# Patient Record
Sex: MALE | Race: WHITE | NOT HISPANIC OR LATINO | Employment: UNEMPLOYED | URBAN - METROPOLITAN AREA
[De-identification: names, ages, dates, MRNs, and addresses within clinical notes are randomized per-mention and may not be internally consistent; named-entity substitution may affect disease eponyms.]

---

## 2017-03-15 ENCOUNTER — HISTORICAL (OUTPATIENT)
Dept: LAB | Facility: HOSPITAL | Age: 47
End: 2017-03-15

## 2017-09-06 ENCOUNTER — HISTORICAL (OUTPATIENT)
Dept: RADIOLOGY | Facility: HOSPITAL | Age: 47
End: 2017-09-06

## 2017-09-20 ENCOUNTER — HISTORICAL (OUTPATIENT)
Dept: LAB | Facility: HOSPITAL | Age: 47
End: 2017-09-20

## 2017-09-20 LAB
ABS NEUT (OLG): 4.8 X10(3)/MCL (ref 2.1–9.2)
ALBUMIN SERPL-MCNC: 3.6 GM/DL (ref 3.4–5)
ALBUMIN/GLOB SERPL: 0.7 RATIO (ref 1.1–2)
ALP SERPL-CCNC: 95 UNIT/L (ref 50–136)
ALT SERPL-CCNC: 157 UNIT/L (ref 12–78)
AST SERPL-CCNC: 131 UNIT/L (ref 15–37)
BASOPHILS # BLD AUTO: 0.1 X10(3)/MCL (ref 0–0.2)
BASOPHILS NFR BLD AUTO: 1 %
BILIRUB SERPL-MCNC: 1.2 MG/DL (ref 0.2–1)
BILIRUBIN DIRECT+TOT PNL SERPL-MCNC: 0.3 MG/DL (ref 0–0.5)
BILIRUBIN DIRECT+TOT PNL SERPL-MCNC: 0.9 MG/DL (ref 0–0.8)
BUN SERPL-MCNC: 13 MG/DL (ref 7–18)
CALCIUM SERPL-MCNC: 9.7 MG/DL (ref 8.5–10.1)
CHLORIDE SERPL-SCNC: 103 MMOL/L (ref 98–107)
CHOLEST SERPL-MCNC: 225 MG/DL (ref 0–200)
CHOLEST/HDLC SERPL: 4.5 {RATIO} (ref 0–5)
CO2 SERPL-SCNC: 27 MMOL/L (ref 21–32)
CREAT SERPL-MCNC: 1.11 MG/DL (ref 0.7–1.3)
EOSINOPHIL # BLD AUTO: 0.2 X10(3)/MCL (ref 0–0.9)
EOSINOPHIL NFR BLD AUTO: 3 %
ERYTHROCYTE [DISTWIDTH] IN BLOOD BY AUTOMATED COUNT: 13.8 % (ref 11.5–17)
EST. AVERAGE GLUCOSE BLD GHB EST-MCNC: 157 MG/DL
GLOBULIN SER-MCNC: 5 GM/DL (ref 2.4–3.5)
GLUCOSE SERPL-MCNC: 302 MG/DL (ref 74–106)
HBA1C MFR BLD: 7.1 % (ref 4.2–6.3)
HCT VFR BLD AUTO: 51.1 % (ref 42–52)
HDLC SERPL-MCNC: 50 MG/DL (ref 35–60)
HGB BLD-MCNC: 17.3 GM/DL (ref 14–18)
LDLC SERPL CALC-MCNC: 154 MG/DL (ref 0–129)
LYMPHOCYTES # BLD AUTO: 1.8 X10(3)/MCL (ref 0.6–4.6)
LYMPHOCYTES NFR BLD AUTO: 24 %
MCH RBC QN AUTO: 32.5 PG (ref 27–31)
MCHC RBC AUTO-ENTMCNC: 33.9 GM/DL (ref 33–36)
MCV RBC AUTO: 96.1 FL (ref 80–94)
MONOCYTES # BLD AUTO: 0.8 X10(3)/MCL (ref 0.1–1.3)
MONOCYTES NFR BLD AUTO: 10 %
NEUTROPHILS # BLD AUTO: 4.8 X10(3)/MCL (ref 1.4–7.9)
NEUTROPHILS NFR BLD AUTO: 63 %
PLATELET # BLD AUTO: 216 X10(3)/MCL (ref 130–400)
PMV BLD AUTO: 10.2 FL (ref 9.4–12.4)
POTASSIUM SERPL-SCNC: 4.7 MMOL/L (ref 3.5–5.1)
PROT SERPL-MCNC: 8.6 GM/DL (ref 6.4–8.2)
RBC # BLD AUTO: 5.32 X10(6)/MCL (ref 4.7–6.1)
SODIUM SERPL-SCNC: 141 MMOL/L (ref 136–145)
TRIGL SERPL-MCNC: 106 MG/DL (ref 30–150)
TSH SERPL-ACNC: 3.78 MIU/ML (ref 0.36–3.74)
VLDLC SERPL CALC-MCNC: 21 MG/DL
WBC # SPEC AUTO: 7.7 X10(3)/MCL (ref 4.5–11.5)

## 2018-03-21 ENCOUNTER — HISTORICAL (OUTPATIENT)
Dept: LAB | Facility: HOSPITAL | Age: 48
End: 2018-03-21

## 2018-03-21 LAB
ALBUMIN SERPL-MCNC: 3.8 GM/DL (ref 3.4–5)
ALBUMIN/GLOB SERPL: 0.8 RATIO (ref 1.1–2)
ALP SERPL-CCNC: 90 UNIT/L (ref 50–136)
ALT SERPL-CCNC: 182 UNIT/L (ref 12–78)
AST SERPL-CCNC: 146 UNIT/L (ref 15–37)
BILIRUB SERPL-MCNC: 1.5 MG/DL (ref 0.2–1)
BILIRUBIN DIRECT+TOT PNL SERPL-MCNC: 0.4 MG/DL (ref 0–0.5)
BILIRUBIN DIRECT+TOT PNL SERPL-MCNC: 1.1 MG/DL (ref 0–0.8)
BUN SERPL-MCNC: 19 MG/DL (ref 7–18)
CALCIUM SERPL-MCNC: 9.9 MG/DL (ref 8.5–10.1)
CHLORIDE SERPL-SCNC: 101 MMOL/L (ref 98–107)
CHOLEST SERPL-MCNC: 198 MG/DL (ref 0–200)
CHOLEST/HDLC SERPL: 3.7 {RATIO} (ref 0–5)
CO2 SERPL-SCNC: 24 MMOL/L (ref 21–32)
CREAT SERPL-MCNC: 1.11 MG/DL (ref 0.7–1.3)
EST. AVERAGE GLUCOSE BLD GHB EST-MCNC: 163 MG/DL
GLOBULIN SER-MCNC: 4.6 GM/DL (ref 2.4–3.5)
GLUCOSE SERPL-MCNC: 148 MG/DL (ref 74–106)
HBA1C MFR BLD: 7.3 % (ref 4.2–6.3)
HDLC SERPL-MCNC: 53 MG/DL (ref 35–60)
LDLC SERPL CALC-MCNC: 116 MG/DL (ref 0–129)
POTASSIUM SERPL-SCNC: 4.4 MMOL/L (ref 3.5–5.1)
PROT SERPL-MCNC: 8.4 GM/DL (ref 6.4–8.2)
SODIUM SERPL-SCNC: 136 MMOL/L (ref 136–145)
TRIGL SERPL-MCNC: 144 MG/DL (ref 30–150)
TSH SERPL-ACNC: 6.85 MIU/ML (ref 0.36–3.74)
VLDLC SERPL CALC-MCNC: 29 MG/DL

## 2018-03-28 ENCOUNTER — HISTORICAL (OUTPATIENT)
Dept: LAB | Facility: HOSPITAL | Age: 48
End: 2018-03-28

## 2018-03-28 ENCOUNTER — HISTORICAL (OUTPATIENT)
Dept: RADIOLOGY | Facility: HOSPITAL | Age: 48
End: 2018-03-28

## 2018-03-28 LAB
APPEARANCE, UA: CLEAR
BACTERIA SPEC CULT: ABNORMAL /HPF
BILIRUB UR QL STRIP: NEGATIVE
COLOR UR: ABNORMAL
CREAT UR-MCNC: 229 MG/DL
FERRITIN SERPL-MCNC: 274.6 NG/ML (ref 8–388)
GLUCOSE (UA): ABNORMAL
HAV IGM SERPL QL IA: NEGATIVE
HBV CORE IGM SERPL QL IA: NEGATIVE
HBV SURFACE AG SERPL QL IA: NEGATIVE
HCV AB SERPL QL IA: NEGATIVE
HEPATITIS PANEL INTERP: NORMAL
HGB UR QL STRIP: NEGATIVE
KETONES UR QL STRIP: NEGATIVE
LEUKOCYTE ESTERASE UR QL STRIP: NEGATIVE
MICROALBUMIN UR-MCNC: 2.2 MG/DL
MICROALBUMIN/CREAT RATIO PNL UR: 9.6 MG/GM CR (ref 0–30)
NITRITE UR QL STRIP: NEGATIVE
PH UR STRIP: 6 [PH] (ref 5–9)
PROT UR QL STRIP: NEGATIVE
RBC #/AREA URNS HPF: ABNORMAL /[HPF]
SP GR UR STRIP: 1.03 (ref 1–1.03)
SQUAMOUS EPITHELIAL, UA: ABNORMAL
UA WBC MAN: ABNORMAL /HPF
UROBILINOGEN UR STRIP-ACNC: 1

## 2018-08-31 ENCOUNTER — HISTORICAL (OUTPATIENT)
Dept: LAB | Facility: HOSPITAL | Age: 48
End: 2018-08-31

## 2018-08-31 LAB
ALBUMIN SERPL-MCNC: 3.7 GM/DL (ref 3.4–5)
ALBUMIN/GLOB SERPL: 0.8 RATIO (ref 1.1–2)
ALP SERPL-CCNC: 89 UNIT/L (ref 50–136)
ALT SERPL-CCNC: 172 UNIT/L (ref 12–78)
AST SERPL-CCNC: 168 UNIT/L (ref 15–37)
BILIRUB SERPL-MCNC: 1.4 MG/DL (ref 0.2–1)
BILIRUBIN DIRECT+TOT PNL SERPL-MCNC: 0.4 MG/DL (ref 0–0.5)
BILIRUBIN DIRECT+TOT PNL SERPL-MCNC: 1 MG/DL (ref 0–0.8)
BUN SERPL-MCNC: 14 MG/DL (ref 7–18)
CALCIUM SERPL-MCNC: 9.7 MG/DL (ref 8.5–10.1)
CHLORIDE SERPL-SCNC: 105 MMOL/L (ref 98–107)
CHOLEST SERPL-MCNC: 177 MG/DL (ref 0–200)
CHOLEST/HDLC SERPL: 3.4 {RATIO} (ref 0–5)
CO2 SERPL-SCNC: 27 MMOL/L (ref 21–32)
CREAT SERPL-MCNC: 1.01 MG/DL (ref 0.7–1.3)
EST. AVERAGE GLUCOSE BLD GHB EST-MCNC: 203 MG/DL
GLOBULIN SER-MCNC: 4.5 GM/DL (ref 2.4–3.5)
GLUCOSE SERPL-MCNC: 142 MG/DL (ref 74–106)
HBA1C MFR BLD: 8.7 % (ref 4.2–6.3)
HDLC SERPL-MCNC: 52 MG/DL (ref 35–60)
LDLC SERPL CALC-MCNC: 107 MG/DL (ref 0–129)
POTASSIUM SERPL-SCNC: 4.8 MMOL/L (ref 3.5–5.1)
PROT SERPL-MCNC: 8.2 GM/DL (ref 6.4–8.2)
SODIUM SERPL-SCNC: 139 MMOL/L (ref 136–145)
TRIGL SERPL-MCNC: 88 MG/DL (ref 30–150)
TSH SERPL-ACNC: 3.98 MIU/L (ref 0.36–3.74)
VLDLC SERPL CALC-MCNC: 18 MG/DL

## 2019-04-01 ENCOUNTER — HISTORICAL (OUTPATIENT)
Dept: LAB | Facility: HOSPITAL | Age: 49
End: 2019-04-01

## 2019-04-01 LAB
ALBUMIN SERPL-MCNC: 3.1 GM/DL (ref 3.4–5)
ALBUMIN/GLOB SERPL: 0.5 {RATIO}
ALP SERPL-CCNC: 88 UNIT/L (ref 45–117)
ALT SERPL-CCNC: 79 UNIT/L (ref 16–61)
AST SERPL-CCNC: 67 UNIT/L (ref 15–37)
BILIRUB SERPL-MCNC: 0.8 MG/DL (ref 0.2–1)
BILIRUBIN DIRECT+TOT PNL SERPL-MCNC: 0.24 MG/DL (ref 0–0.2)
BILIRUBIN DIRECT+TOT PNL SERPL-MCNC: 0.56 MG/DL (ref 0–1)
BUN SERPL-MCNC: 16 MG/DL (ref 7–18)
CALCIUM SERPL-MCNC: 8.6 MG/DL (ref 8.5–10.1)
CHLORIDE SERPL-SCNC: 107 MMOL/L (ref 98–107)
CO2 SERPL-SCNC: 23 MMOL/L (ref 21–32)
CREAT SERPL-MCNC: 0.99 MG/DL (ref 0.7–1.3)
GLOBULIN SER-MCNC: 6 GM/DL (ref 2–4)
GLUCOSE SERPL-MCNC: 220 MG/DL (ref 74–106)
POTASSIUM SERPL-SCNC: 4.1 MMOL/L (ref 3.5–5.1)
PROT SERPL-MCNC: 8.6 GM/DL (ref 6.4–8.2)
SODIUM SERPL-SCNC: 140 MMOL/L (ref 136–145)

## 2019-08-15 ENCOUNTER — HISTORICAL (OUTPATIENT)
Dept: LAB | Facility: HOSPITAL | Age: 49
End: 2019-08-15

## 2019-08-15 LAB
ABS NEUT (OLG): 4.02 X10(3)/MCL (ref 2.1–9.2)
ALBUMIN SERPL-MCNC: 3.3 GM/DL (ref 3.4–5)
ALBUMIN/GLOB SERPL: 0.7 RATIO (ref 1.1–2)
ALP SERPL-CCNC: 91 UNIT/L (ref 50–136)
ALT SERPL-CCNC: 115 UNIT/L (ref 12–78)
AST SERPL-CCNC: 92 UNIT/L (ref 15–37)
BASOPHILS # BLD AUTO: 0.1 X10(3)/MCL (ref 0–0.2)
BASOPHILS NFR BLD AUTO: 1 %
BILIRUB SERPL-MCNC: 1.2 MG/DL (ref 0.2–1)
BILIRUBIN DIRECT+TOT PNL SERPL-MCNC: 0.3 MG/DL (ref 0–0.5)
BILIRUBIN DIRECT+TOT PNL SERPL-MCNC: 0.9 MG/DL (ref 0–0.8)
BUN SERPL-MCNC: 15 MG/DL (ref 7–18)
CALCIUM SERPL-MCNC: 9.3 MG/DL (ref 8.5–10.1)
CHLORIDE SERPL-SCNC: 103 MMOL/L (ref 98–107)
CHOLEST SERPL-MCNC: 188 MG/DL (ref 0–200)
CHOLEST/HDLC SERPL: 3.3 {RATIO} (ref 0–5)
CO2 SERPL-SCNC: 25 MMOL/L (ref 21–32)
CREAT SERPL-MCNC: 0.86 MG/DL (ref 0.7–1.3)
EOSINOPHIL # BLD AUTO: 0.3 X10(3)/MCL (ref 0–0.9)
EOSINOPHIL NFR BLD AUTO: 5 %
ERYTHROCYTE [DISTWIDTH] IN BLOOD BY AUTOMATED COUNT: 14.3 % (ref 11.5–17)
EST. AVERAGE GLUCOSE BLD GHB EST-MCNC: 186 MG/DL
GLOBULIN SER-MCNC: 4.9 GM/DL (ref 2.4–3.5)
GLUCOSE SERPL-MCNC: 239 MG/DL (ref 74–106)
HBA1C MFR BLD: 8.1 % (ref 4.2–6.3)
HCT VFR BLD AUTO: 50.1 % (ref 42–52)
HDLC SERPL-MCNC: 57 MG/DL (ref 35–60)
HGB BLD-MCNC: 16.4 GM/DL (ref 14–18)
LDLC SERPL CALC-MCNC: 110 MG/DL (ref 0–129)
LYMPHOCYTES # BLD AUTO: 2 X10(3)/MCL (ref 0.6–4.6)
LYMPHOCYTES NFR BLD AUTO: 28 %
MCH RBC QN AUTO: 31.3 PG (ref 27–31)
MCHC RBC AUTO-ENTMCNC: 32.7 GM/DL (ref 33–36)
MCV RBC AUTO: 95.6 FL (ref 80–94)
MONOCYTES # BLD AUTO: 0.7 X10(3)/MCL (ref 0.1–1.3)
MONOCYTES NFR BLD AUTO: 10 %
NEUTROPHILS # BLD AUTO: 4.02 X10(3)/MCL (ref 2.1–9.2)
NEUTROPHILS NFR BLD AUTO: 56 %
PLATELET # BLD AUTO: 169 X10(3)/MCL (ref 130–400)
PMV BLD AUTO: 11 FL (ref 9.4–12.4)
POTASSIUM SERPL-SCNC: 4.3 MMOL/L (ref 3.5–5.1)
PROT SERPL-MCNC: 8.2 GM/DL (ref 6.4–8.2)
PSA SERPL-MCNC: 0.68 NG/ML (ref 0–4)
RBC # BLD AUTO: 5.24 X10(6)/MCL (ref 4.7–6.1)
SODIUM SERPL-SCNC: 139 MMOL/L (ref 136–145)
TRIGL SERPL-MCNC: 104 MG/DL (ref 30–150)
TSH SERPL-ACNC: 1.34 MIU/L (ref 0.36–3.74)
VLDLC SERPL CALC-MCNC: 21 MG/DL
WBC # SPEC AUTO: 7.2 X10(3)/MCL (ref 4.5–11.5)

## 2019-12-26 ENCOUNTER — HISTORICAL (OUTPATIENT)
Dept: LAB | Facility: HOSPITAL | Age: 49
End: 2019-12-26

## 2019-12-26 LAB
APTT PPP: 30.3 SEC (ref 23.4–34.9)
CHOLEST SERPL-MCNC: 159 MG/DL (ref 0–199)
CHOLEST/HDLC SERPL: 3 {RATIO}
EST. AVERAGE GLUCOSE BLD GHB EST-MCNC: 108 MG/DL
HBA1C MFR BLD: 5.4 % (ref 4.2–6.3)
HDLC SERPL-MCNC: 46 MG/DL
INR PPP: 1.1 (ref 2–3)
LDLC SERPL CALC-MCNC: 96 MG/DL (ref 0–129)
PROTHROMBIN TIME: 14.5 SEC (ref 11.7–14.5)
TRIGL SERPL-MCNC: 87 MG/DL (ref 0–149)
TSH SERPL-ACNC: 1.05 MIU/ML (ref 0.36–3.74)
VLDLC SERPL CALC-MCNC: 17 MG/DL

## 2019-12-27 ENCOUNTER — HISTORICAL (OUTPATIENT)
Dept: RADIOLOGY | Facility: HOSPITAL | Age: 49
End: 2019-12-27

## 2020-05-26 ENCOUNTER — HOSPITAL ENCOUNTER (OUTPATIENT)
Dept: MEDSURG UNIT | Facility: HOSPITAL | Age: 50
End: 2020-05-29
Attending: INTERNAL MEDICINE | Admitting: INTERNAL MEDICINE

## 2020-05-26 LAB
ABS NEUT (OLG): 5.73 X10(3)/MCL (ref 2.1–9.2)
ALBUMIN SERPL-MCNC: 3.3 GM/DL (ref 3.5–5)
ALBUMIN/GLOB SERPL: 0.7 RATIO (ref 1.1–2)
ALP SERPL-CCNC: 103 UNIT/L (ref 40–150)
ALT SERPL-CCNC: 76 UNIT/L (ref 0–55)
AMPHET UR QL SCN: NEGATIVE
APPEARANCE, UA: CLEAR
AST SERPL-CCNC: 66 UNIT/L (ref 5–34)
BARBITURATE SCN PRESENT UR: NEGATIVE
BASOPHILS # BLD AUTO: 0.05 X10(3)/MCL (ref 0–0.2)
BASOPHILS NFR BLD AUTO: 0.6 % (ref 0–0.9)
BENZODIAZ UR QL SCN: NEGATIVE
BILIRUB SERPL-MCNC: 1.7 MG/DL (ref 0.2–1.2)
BILIRUB UR QL STRIP: NEGATIVE
BILIRUBIN DIRECT+TOT PNL SERPL-MCNC: 0.6 MG/DL (ref 0–0.5)
BILIRUBIN DIRECT+TOT PNL SERPL-MCNC: 1.1 MG/DL (ref 0–0.8)
BUN SERPL-MCNC: 23.3 MG/DL (ref 8.4–25.7)
CALCIUM SERPL-MCNC: 9.5 MG/DL (ref 8.4–10.2)
CANNABINOIDS UR QL SCN: NEGATIVE
CHLORIDE SERPL-SCNC: 107 MMOL/L (ref 98–107)
CO2 SERPL-SCNC: 24 MMOL/L (ref 22–29)
COCAINE UR QL SCN: NEGATIVE
COLOR UR: YELLOW
CREAT SERPL-MCNC: 0.76 MG/DL (ref 0.72–1.25)
EOSINOPHIL # BLD AUTO: 0.15 X10(3)/MCL (ref 0–0.9)
EOSINOPHIL NFR BLD AUTO: 1.7 % (ref 0–6.5)
ERYTHROCYTE [DISTWIDTH] IN BLOOD BY AUTOMATED COUNT: 13.8 % (ref 11.5–17)
GLOBULIN SER-MCNC: 4.5 GM/DL (ref 2.4–3.5)
GLUCOSE (UA): NEGATIVE
GLUCOSE SERPL-MCNC: 132 MG/DL (ref 74–100)
HCT VFR BLD AUTO: 43 % (ref 42–52)
HGB BLD-MCNC: 14.8 GM/DL (ref 14–18)
HGB UR QL STRIP: NEGATIVE
IMM GRANULOCYTES # BLD AUTO: 0.01 10*3/UL (ref 0–0.02)
IMM GRANULOCYTES NFR BLD AUTO: 0.1 % (ref 0–0.43)
KETONES UR QL STRIP: NEGATIVE
LACTATE SERPL-SCNC: 0.9 MMOL/L (ref 0.5–2.2)
LEUKOCYTE ESTERASE UR QL STRIP: NEGATIVE
LYMPHOCYTES # BLD AUTO: 1.99 X10(3)/MCL (ref 0.6–4.6)
LYMPHOCYTES NFR BLD AUTO: 22.8 % (ref 16.2–38.3)
MCH RBC QN AUTO: 32.7 PG (ref 27–31)
MCHC RBC AUTO-ENTMCNC: 34.4 GM/DL (ref 33–36)
MCV RBC AUTO: 94.9 FL (ref 80–94)
METHADONE UR QL SCN: NEGATIVE
MONOCYTES # BLD AUTO: 0.8 X10(3)/MCL (ref 0.1–1.3)
MONOCYTES NFR BLD AUTO: 9.2 % (ref 4.7–11.3)
NEUTROPHILS # BLD AUTO: 5.73 X10(3)/MCL (ref 2.1–9.2)
NEUTROPHILS NFR BLD AUTO: 65.6 % (ref 49.1–73.4)
NITRITE UR QL STRIP: NEGATIVE
NRBC BLD AUTO-RTO: 0 % (ref 0–0.2)
OPIATES UR QL SCN: NEGATIVE
PCP UR QL: NEGATIVE
PH UR STRIP: 6 [PH] (ref 5–7)
PLATELET # BLD AUTO: 155 X10(3)/MCL (ref 130–400)
PMV BLD AUTO: 9.6 FL (ref 7.4–10.4)
POTASSIUM SERPL-SCNC: 3.9 MMOL/L (ref 3.5–5.1)
PROT SERPL-MCNC: 7.8 GM/DL (ref 6.4–8.3)
PROT UR QL STRIP: NEGATIVE
RBC # BLD AUTO: 4.53 X10(6)/MCL (ref 4.7–6.1)
SARS-COV-2 RNA RESP QL NAA+PROBE: NOT DETECTED
SODIUM SERPL-SCNC: 140 MMOL/L (ref 136–145)
SP GR UR STRIP: 1.02 (ref 1–1.03)
TROPONIN I SERPL-MCNC: <0.01 NG/ML (ref 0.01–0.03)
TSH SERPL-ACNC: 2.25 UIU/ML (ref 0.35–4.94)
UROBILINOGEN UR STRIP-ACNC: NEGATIVE
WBC # SPEC AUTO: 8.7 X10(3)/MCL (ref 4.5–11.5)

## 2020-05-27 LAB
ABS NEUT (OLG): 3.44 X10(3)/MCL (ref 2.1–9.2)
BASOPHILS # BLD AUTO: 0 X10(3)/MCL (ref 0–0.2)
BASOPHILS NFR BLD AUTO: 1 %
BUN SERPL-MCNC: 20.1 MG/DL (ref 8.4–25.7)
CALCIUM SERPL-MCNC: 8.9 MG/DL (ref 8.4–10.2)
CHLORIDE SERPL-SCNC: 108 MMOL/L (ref 98–107)
CO2 SERPL-SCNC: 23 MMOL/L (ref 22–29)
CREAT SERPL-MCNC: 0.72 MG/DL (ref 0.73–1.18)
CREAT/UREA NIT SERPL: 28
D DIMER PPP IA.FEU-MCNC: <0.27 MCG/ML FEU (ref 0–0.5)
EOSINOPHIL # BLD AUTO: 0.2 X10(3)/MCL (ref 0–0.9)
EOSINOPHIL NFR BLD AUTO: 3 %
ERYTHROCYTE [DISTWIDTH] IN BLOOD BY AUTOMATED COUNT: 14.2 % (ref 11.5–17)
EST. AVERAGE GLUCOSE BLD GHB EST-MCNC: 102.5 MG/DL
GLUCOSE SERPL-MCNC: 97 MG/DL (ref 74–100)
HBA1C MFR BLD: 5.2 %
HCT VFR BLD AUTO: 41.1 % (ref 42–52)
HGB BLD-MCNC: 13.4 GM/DL (ref 14–18)
LYMPHOCYTES # BLD AUTO: 2 X10(3)/MCL (ref 0.6–4.6)
LYMPHOCYTES NFR BLD AUTO: 32 %
MCH RBC QN AUTO: 31.7 PG (ref 27–31)
MCHC RBC AUTO-ENTMCNC: 32.6 GM/DL (ref 33–36)
MCV RBC AUTO: 97.2 FL (ref 80–94)
MONOCYTES # BLD AUTO: 0.7 X10(3)/MCL (ref 0.1–1.3)
MONOCYTES NFR BLD AUTO: 10 %
NEUTROPHILS # BLD AUTO: 3.44 X10(3)/MCL (ref 2.1–9.2)
NEUTROPHILS NFR BLD AUTO: 54 %
PLATELET # BLD AUTO: 126 X10(3)/MCL (ref 130–400)
PMV BLD AUTO: 10.5 FL (ref 9.4–12.4)
POTASSIUM SERPL-SCNC: 4.2 MMOL/L (ref 3.5–5.1)
RBC # BLD AUTO: 4.23 X10(6)/MCL (ref 4.7–6.1)
SODIUM SERPL-SCNC: 140 MMOL/L (ref 136–145)
WBC # SPEC AUTO: 6.4 X10(3)/MCL (ref 4.5–11.5)

## 2020-05-28 LAB
ALBUMIN SERPL-MCNC: 3.1 GM/DL (ref 3.5–5)
ALBUMIN/GLOB SERPL: 0.7 RATIO (ref 1.1–2)
ALP SERPL-CCNC: 93 UNIT/L (ref 40–150)
ALT SERPL-CCNC: 74 UNIT/L (ref 0–55)
AST SERPL-CCNC: 79 UNIT/L (ref 5–34)
BILIRUB SERPL-MCNC: 1.5 MG/DL
BILIRUBIN DIRECT+TOT PNL SERPL-MCNC: 0.6 MG/DL (ref 0–0.5)
BILIRUBIN DIRECT+TOT PNL SERPL-MCNC: 0.9 MG/DL (ref 0–0.8)
BUN SERPL-MCNC: 21.5 MG/DL (ref 8.4–25.7)
CALCIUM SERPL-MCNC: 9 MG/DL (ref 8.4–10.2)
CHLORIDE SERPL-SCNC: 106 MMOL/L (ref 98–107)
CO2 SERPL-SCNC: 25 MMOL/L (ref 22–29)
CREAT SERPL-MCNC: 0.65 MG/DL (ref 0.73–1.18)
GLOBULIN SER-MCNC: 4.3 GM/DL (ref 2.4–3.5)
GLUCOSE SERPL-MCNC: 108 MG/DL (ref 74–100)
POTASSIUM SERPL-SCNC: 3.9 MMOL/L (ref 3.5–5.1)
PROT SERPL-MCNC: 7.4 GM/DL (ref 6.4–8.3)
SODIUM SERPL-SCNC: 139 MMOL/L (ref 136–145)

## 2020-05-31 LAB
FINAL CULTURE: NORMAL
FINAL CULTURE: NORMAL

## 2020-06-09 ENCOUNTER — HISTORICAL (OUTPATIENT)
Dept: LAB | Facility: HOSPITAL | Age: 50
End: 2020-06-09

## 2020-06-09 LAB
CREAT UR-MCNC: 112.7 MG/DL (ref 58–161)
MICROALBUMIN UR-MCNC: 7.3 UG/ML
MICROALBUMIN/CREAT RATIO PNL UR: 6.5 MG/GM CR (ref 0–30)

## 2020-07-06 ENCOUNTER — HISTORICAL (OUTPATIENT)
Dept: LAB | Facility: HOSPITAL | Age: 50
End: 2020-07-06

## 2020-07-06 LAB
ABS NEUT (OLG): 4.21 X10(3)/MCL (ref 2.1–9.2)
ALBUMIN SERPL-MCNC: 3.5 GM/DL (ref 3.5–5)
ALBUMIN/GLOB SERPL: 0.7 RATIO (ref 1.1–2)
ALP SERPL-CCNC: 115 UNIT/L (ref 40–150)
ALT SERPL-CCNC: 108 UNIT/L (ref 0–55)
AST SERPL-CCNC: 96 UNIT/L (ref 5–34)
BILIRUB SERPL-MCNC: 1.9 MG/DL (ref 0.2–1.2)
BILIRUBIN DIRECT+TOT PNL SERPL-MCNC: 0.6 MG/DL (ref 0–0.5)
BILIRUBIN DIRECT+TOT PNL SERPL-MCNC: 1.3 MG/DL (ref 0–0.8)
BUN SERPL-MCNC: 20.3 MG/DL (ref 8.4–25.7)
CALCIUM SERPL-MCNC: 9.8 MG/DL (ref 8.4–10.2)
CHLORIDE SERPL-SCNC: 104 MMOL/L (ref 98–107)
CO2 SERPL-SCNC: 24 MMOL/L (ref 22–29)
CREAT SERPL-MCNC: 0.7 MG/DL (ref 0.72–1.25)
ERYTHROCYTE [DISTWIDTH] IN BLOOD BY AUTOMATED COUNT: 13.6 % (ref 11.5–17)
FERRITIN SERPL-MCNC: 194.07 NG/ML (ref 21.81–274.66)
GLOBULIN SER-MCNC: 4.7 GM/DL (ref 2.4–3.5)
GLUCOSE SERPL-MCNC: 100 MG/DL (ref 74–100)
HBV SURFACE AB SERPL IA-ACNC: NONREACTIVE M[IU]/ML
HCT VFR BLD AUTO: 44.4 % (ref 42–52)
HGB BLD-MCNC: 15.3 GM/DL (ref 14–18)
INR PPP: 1.1 (ref 2–3)
IRON SATN MFR SERPL: 38 % (ref 20–50)
IRON SERPL-MCNC: 142 UG/DL (ref 65–175)
MCH RBC QN AUTO: 31.9 PG (ref 27–31)
MCHC RBC AUTO-ENTMCNC: 34.5 GM/DL (ref 33–36)
MCV RBC AUTO: 92.7 FL (ref 80–94)
NRBC BLD AUTO-RTO: 0 % (ref 0–0.2)
PLATELET # BLD AUTO: 169 X10(3)/MCL (ref 130–400)
PMV BLD AUTO: 10 FL (ref 7.4–10.4)
POTASSIUM SERPL-SCNC: 4.1 MMOL/L (ref 3.5–5.1)
PROT SERPL-MCNC: 8.2 GM/DL (ref 6.4–8.3)
PROTHROMBIN TIME: 14.3 SEC (ref 11.7–14.5)
RBC # BLD AUTO: 4.79 X10(6)/MCL (ref 4.7–6.1)
SODIUM SERPL-SCNC: 138 MMOL/L (ref 136–145)
TIBC SERPL-MCNC: 229 UG/DL (ref 69–240)
TIBC SERPL-MCNC: 371 UG/DL (ref 250–450)
TRANSFERRIN SERPL-MCNC: 325 MG/DL (ref 174–364)
WBC # SPEC AUTO: 7.1 X10(3)/MCL (ref 4.5–11.5)

## 2020-10-16 ENCOUNTER — HISTORICAL (OUTPATIENT)
Dept: ADMINISTRATIVE | Facility: HOSPITAL | Age: 50
End: 2020-10-16

## 2020-10-16 LAB
ABS NEUT (OLG): 3.97 X10(3)/MCL (ref 2.1–9.2)
ALBUMIN SERPL-MCNC: 3.8 GM/DL (ref 3.5–5)
ALBUMIN/GLOB SERPL: 1 RATIO (ref 1.1–2)
ALP SERPL-CCNC: 94 UNIT/L (ref 40–150)
ALT SERPL-CCNC: 101 UNIT/L (ref 0–55)
APPEARANCE, UA: CLEAR
AST SERPL-CCNC: 94 UNIT/L (ref 5–34)
BACTERIA SPEC CULT: ABNORMAL /HPF
BASOPHILS # BLD AUTO: 0.1 X10(3)/MCL (ref 0–0.2)
BASOPHILS NFR BLD AUTO: 1 %
BILIRUB SERPL-MCNC: 2 MG/DL
BILIRUB UR QL STRIP: NEGATIVE
BILIRUBIN DIRECT+TOT PNL SERPL-MCNC: 0.7 MG/DL (ref 0–0.5)
BILIRUBIN DIRECT+TOT PNL SERPL-MCNC: 1.3 MG/DL (ref 0–0.8)
BUN SERPL-MCNC: 20.8 MG/DL (ref 8.4–25.7)
CALCIUM SERPL-MCNC: 8.9 MG/DL (ref 8.4–10.2)
CHLORIDE SERPL-SCNC: 104 MMOL/L (ref 98–107)
CHOLEST SERPL-MCNC: 161 MG/DL
CHOLEST/HDLC SERPL: 3 {RATIO} (ref 0–5)
CO2 SERPL-SCNC: 25 MMOL/L (ref 22–29)
COLOR UR: YELLOW
CREAT SERPL-MCNC: 0.73 MG/DL (ref 0.73–1.18)
CREAT UR-MCNC: 107 MG/DL (ref 58–161)
EOSINOPHIL # BLD AUTO: 0.3 X10(3)/MCL (ref 0–0.9)
EOSINOPHIL NFR BLD AUTO: 4 %
ERYTHROCYTE [DISTWIDTH] IN BLOOD BY AUTOMATED COUNT: 14 % (ref 11.5–17)
EST. AVERAGE GLUCOSE BLD GHB EST-MCNC: 102.5 MG/DL
GLOBULIN SER-MCNC: 3.7 GM/DL (ref 2.4–3.5)
GLUCOSE (UA): NEGATIVE
GLUCOSE SERPL-MCNC: 95 MG/DL (ref 74–100)
HBA1C MFR BLD: 5.2 %
HCT VFR BLD AUTO: 47.5 % (ref 42–52)
HDLC SERPL-MCNC: 60 MG/DL (ref 35–60)
HGB BLD-MCNC: 15.6 GM/DL (ref 14–18)
HGB UR QL STRIP: NEGATIVE
KETONES UR QL STRIP: NEGATIVE
LDLC SERPL CALC-MCNC: 85 MG/DL (ref 50–140)
LEUKOCYTE ESTERASE UR QL STRIP: ABNORMAL
LYMPHOCYTES # BLD AUTO: 2.1 X10(3)/MCL (ref 0.6–4.6)
LYMPHOCYTES NFR BLD AUTO: 29 %
MCH RBC QN AUTO: 31.6 PG (ref 27–31)
MCHC RBC AUTO-ENTMCNC: 32.8 GM/DL (ref 33–36)
MCV RBC AUTO: 96.3 FL (ref 80–94)
MICROALBUMIN UR-MCNC: 16.1 UG/ML
MICROALBUMIN/CREAT RATIO PNL UR: 15 MG/GM CR (ref 0–30)
MONOCYTES # BLD AUTO: 0.9 X10(3)/MCL (ref 0.1–1.3)
MONOCYTES NFR BLD AUTO: 12 %
NEUTROPHILS # BLD AUTO: 3.97 X10(3)/MCL (ref 2.1–9.2)
NEUTROPHILS NFR BLD AUTO: 54 %
NITRITE UR QL STRIP: NEGATIVE
PH UR STRIP: 6 [PH] (ref 5–9)
PLATELET # BLD AUTO: 151 X10(3)/MCL (ref 130–400)
PMV BLD AUTO: 10.6 FL (ref 9.4–12.4)
POTASSIUM SERPL-SCNC: 4.1 MMOL/L (ref 3.5–5.1)
PROT SERPL-MCNC: 7.5 GM/DL (ref 6.4–8.3)
PROT UR QL STRIP: NEGATIVE
RBC # BLD AUTO: 4.93 X10(6)/MCL (ref 4.7–6.1)
RBC #/AREA URNS HPF: ABNORMAL /[HPF]
SODIUM SERPL-SCNC: 138 MMOL/L (ref 136–145)
SP GR UR STRIP: 1.02 (ref 1–1.03)
SQUAMOUS EPITHELIAL, UA: ABNORMAL
TRIGL SERPL-MCNC: 79 MG/DL (ref 34–140)
TSH SERPL-ACNC: 1.55 UIU/ML (ref 0.35–4.94)
UROBILINOGEN UR STRIP-ACNC: 0.2
VLDLC SERPL CALC-MCNC: 16 MG/DL
WBC # SPEC AUTO: 7.3 X10(3)/MCL (ref 4.5–11.5)
WBC #/AREA URNS HPF: ABNORMAL /HPF

## 2020-12-29 LAB — CRC RECOMMENDATION EXT: NORMAL

## 2022-04-10 ENCOUNTER — HISTORICAL (OUTPATIENT)
Dept: ADMINISTRATIVE | Facility: HOSPITAL | Age: 52
End: 2022-04-10
Payer: MEDICARE

## 2022-04-24 VITALS
BODY MASS INDEX: 37.07 KG/M2 | DIASTOLIC BLOOD PRESSURE: 73 MMHG | OXYGEN SATURATION: 97 % | HEIGHT: 72 IN | WEIGHT: 273.69 LBS | SYSTOLIC BLOOD PRESSURE: 127 MMHG

## 2022-04-29 NOTE — ED PROVIDER NOTES
Patient:   Naeem Muro             MRN: [id number]            FIN: [id number]               Age:   50 years     Sex:  Male     :  1970   Associated Diagnoses:   Acute neck pain; Acute chest pain; Altered level of consciousness; Near syncope   Author:   Comfort Dick MD      Basic Information   History source: Patient.   Arrival mode: Private vehicle.   History limitation: None.   Additional information: Patient's physician(s): PCP Dr Ramirez, Chief Complaint from Nursing Triage Note : Chief Complaint   2020 0:00 CDT       Chief Complaint           neck and chest pain muscle spasms starting 30 minutes  .      History of Present Illness   The patient presents with neck pain and Mr Muro is a   year old male who was seated on the sofa and when he stood up, he developed sudden onset pain to the right posterior neck radiating into the right occiput.  He felt dizzy and stumbled forward falling on the recliner in front of him.  He did not pass out but said he was very tired and his wife kept having to tap him to wake him up.  He Said the pain ]felt like he was hit in his right side of his neck then radiated to the right occiput.  He then developed chest pain, a sharp stabbing sensation radiating to the back.  On the way here he felt like he was having muscle spasms and sharp pain in his left anterior thigh and also in his right arm.  He says he just doesn't feel right, feels very sleepy and tired.  He does take Norco 7.5mg TID but his dose is unchanged.  He had a gastric bypass in January and states that prior to that he had a nuclear stress test and was told his heart was fine..  The onset was just prior to arrival.  The course/duration of symptoms is constant.  Location: Right neck. Type of injury: none.  The location where the incident occurred was at home.  Radiating pain Right occiput.  The character of symptoms is pain.  The degree at present is severe.  The exacerbating factor is none.   The relieving factor is none.  Associated symptoms: Sleepy and tired.        Review of Systems   Cardiovascular symptoms:  Chest pain.   Musculoskeletal symptoms:  neck pain.   Neurologic symptoms:  Altered level of consciousness, near syncope.              Additional review of systems information: All other systems reviewed and otherwise negative.      Health Status   Allergies:    Allergic Reactions (Selected)  No Known Allergies  No Known Medication Allergies,    Allergies (2) Active Reaction  No Known Allergies None Documented  No Known Medication Allergies None Documented  .   Medications:  (Selected)   Prescriptions  Prescribed  Nystop 100,000 units/g topical powder: See Instructions, APPLY TO THE AFFECTED AREA TWICE DAILY, # 60 gm, 6 Refill(s), eRx: [facility] #[id number]  cyclobenzaprine 10 mg oral tablet: See Instructions, TAKE 1 TABLET BY MOUTH THREE TIMES DAILY AS NEEDED FOR SPASM, # 90 tab(s), 1 Refill(s), eRx: [facility] [id number]  fluticasone 50 mcg/inh nasal spray: See Instructions, SHAKE LIQUID AND USE 1 SPRAY IN EACH NOSTRIL TWICE DAILY, # 16 gm, 12 Refill(s), Pharmacy: [facility] #[id number], 192, cm, Height/Length Dosing, 02/05/20 15:27:00 CST, 168, kg, Weight Dosing, 02/05/20 15:27:00 CST  levothyroxine 75 mcg (0.075 mg) oral tablet: 75 mcg = 1 tab(s), Oral, Daily, # 90 tab(s), 3 Refill(s), Pharmacy: [facility] #[id number], 192, cm, Height/Length Dosing, 04/21/20 9:32:00 CDT, 155, kg, Weight Dosing, 04/21/20 9:32:00 CDT  Documented Medications  Documented  HYDROCODONE-ACETAMIN 7.5-325: .      Past Medical/ Family/ Social History   Medical history:    Resolved  Elevated TSH (534779712):  Resolved.  Balanitis (61813174):  Resolved.  Hematuria (605606014):  Resolved.  Asthma ([id number]):  Resolved.  Diabetes ([id number]):  Resolved.  Osteoarthritis ([id number]):  Resolved.  Need for Tdap vaccination  ([id number]):  Resolved.  Influenza (30653240):  Resolved.  BMI 45.0-49.9, adult (3943765666):  Resolved., Reviewed as documented in chart.   Surgical history:    Rt. Carpal tunnel ([id number]) in 1996 at 26 Years.  right hand surgery.  gastric sleeve., Reviewed as documented in chart.   Family history:    Ovarian cancer  Mother  , Reviewed as documented in chart.   Social history: Tobacco use: Denies.   Problem list:    Active Problems (10)  Abnormal liver enzymes   Arthritis of knee   BMI 40.0-44.9, adult   Diabetes mellitus type 2   Hypothyroidism   Low back pain   VIVIAN (obstructive sleep apnea)   Osteoarthritis of left knee   Prostate cancer screening   RSD (reflex sympathetic dystrophy)   , per nurse's notes.      Physical Examination               Vital Signs      Vital Signs (last 24 hrs)_____  Last Charted___________  Temp Oral     36.9 DegC  (MAY 26 00:00)  Heart Rate Peripheral   83 bpm  (MAY 26 00:00)  Resp Rate         18 br/min  (MAY 26 00:00)  SBP      H 163mmHg  (MAY 26 00:00)  DBP      74 mmHg  (MAY 26 00:00)  SpO2      97 %  (MAY 26 00:00)  .   Basic Oxygen Information   5/26/2020 0:00 CDT       SpO2                      97 %                             Oxygen Therapy            Room air  .   General:  Alert.   Skin:  Warm, dry, pink, intact.    Eye:  Pupils are equal, round and reactive to light.   Neck:  Supple, no JVD.    Cardiovascular:  Regular rate and rhythm.   Respiratory:  Lungs are clear to auscultation.   Gastrointestinal:  Soft, Nontender.    Musculoskeletal:  Normal ROM, normal strength, no tenderness, no swelling, no deformity, No sign of DVT.    Neurological:  No focal neurological deficit observed, CN II-XII intact, normal sensory observed, normal motor observed, normal speech observed, normal coordination observed, Just seems sleepy and tired, oriented to person place and situation but has eyes closed and drifts off to sleep when not stimulated.    Psychiatric:  Cooperative.       Medical Decision Making   Differential Diagnosis:  Subarachnoid hemorrhage, meningitis, migraine, cervical radiculopathy, CVA.   Documents reviewed:  Emergency department nurses' notes.   Orders  Launch Orders   Radiology:  CT Angio Neck W Contrast (Order): Stat, 5/26/2020 0:28 CDT, Other (please specify), None, Stretcher, Rule out bleed, and vetebral artery dissection, Creatinine if needed per protocol, Rad Type  CT Angio Head W W/O Contrast (Order): Stat, 5/26/2020 0:28 CDT, Other (please specify), None, Stretcher, Rule out bleed and vetebral artery dissection, Creatinine if needed per protocol, Rad Type, Launch Orders   Radiology:  CT Head W/O Contrast (Discontinue): 5/26/2020 0:30 CDT, Launch Orders   Laboratory:  Drug Screen Urine [facility] (Order): Stat collect, Urine, 5/26/2020 0:46 CDT, Nurse collect  Urinalysis with Microscopic if Indicated (Order): Stat collect, Urine, 5/26/2020 0:46 CDT, Nurse collect.    Electrocardiogram:  Time 5/26/2020 00:04:00, rate 76, normal sinus rhythm, No ST-T changes, no ectopy, normal NE & QRS intervals.    Results review:  Lab results : Lab View   5/26/2020 0:08 CDT       Sodium Lvl                140 mmol/L                             Potassium Lvl             3.9 mmol/L                             Chloride                  107 mmol/L                             CO2                       24 mmol/L                             Calcium Lvl               9.5 mg/dL                             Glucose Lvl               132 mg/dL  HI                             BUN                       23.3 mg/dL                             Creatinine                0.76 mg/dL                             eGFR-AA                   >60  NA                             eGFR-NIKKI                  >60  NA                             Bili Total                1.7 mg/dL  HI                             Bili Direct               0.6 mg/dL  HI                             Bili Indirect             1.10 mg/dL   HI                             AST                       66 unit/L  HI                             ALT                       76 unit/L  HI                             Alk Phos                  103 unit/L                             Total Protein             7.8 gm/dL                             Albumin Lvl               3.3 gm/dL  LOW                             Globulin                  4.5 gm/dL  HI                             A/G Ratio                 0.7 ratio  LOW                             Troponin-I                <0.01 ng/mL                             WBC                       8.7 x10(3)/mcL                             RBC                       4.53 x10(6)/mcL  LOW                             Hgb                       14.8 gm/dL                             Hct                       43.0 %                             Platelet                  155 x10(3)/mcL                             MCV                       94.9 fL  HI                             MCH                       32.7 pg  HI                             MCHC                      34.4 gm/dL                             RDW                       13.8 %                             MPV                       9.6 fL                             Abs Neut                  5.73 x10(3)/mcL                             Neutro Auto               65.6 %                             Lymph Auto                22.8 %                             Mono Auto                 9.2 %                             Eos Auto                  1.7 %                             Abs Eos                   0.15 x10(3)/mcL                             Basophil Auto             0.6 %                             Abs Neutro                5.73 x10(3)/mcL                             Abs Lymph                 1.99 x10(3)/mcL                             Abs Mono                  0.80 x10(3)/mcL                             Abs Baso                  0.05 x10(3)/mcL                              NRBC%                     0.0 %                             IG%                       0.100 %                             IG#                       0.0100  .   Radiology results:  CT head with no contrast was negative for acute findings.  CT angiogram head and neck was also normal..      Reexamination/ Reevaluation   Time: 5/26/2020 02:07:00 .   Vital signs   results included from flowsheet : Vital Signs   5/26/2020 2:08 CDT       Peripheral Pulse Rate     65 bpm                             Respiratory Rate          17 br/min                             SpO2                      97 %                             Systolic Blood Pressure   136 mmHg                             Diastolic Blood Pressure  68 mmHg     Notes: Pt is feeling better after pain medication.  Now more alert and talking normally and his wife said his mentation is back to normal..   Time: 5/26/2020 03:25:00 .   Notes: Patient is feeling much better and was asking about going home.  However, he now has a petechial rash on his right ankle which was not present before.  On close inspection, there are no petechia anywhere else.  He has a couple marks on his upper back which appear to be mosquito bites and the healing lesion on his right medial calf from a previous injury.  I will keep a close eye on the petechial rash.  It is concerning for meningitis, however he currently has no pain, no headache, no neck pain, no nausea, no photophobia, no neck stiffness.  He states he feels much better at this time..      Impression and Plan   Diagnosis   Acute neck pain (OZG17-XF M54.2)   Acute chest pain (LHN48-JW R07.9)   Altered level of consciousness (POO81-DD R40.4)   Near syncope (YUO88-GD R55)      Calls-Consults   -  5/26/2020 02:51:00 , Place in observation, remote telemetry, discussed with Brisa moreland for Dr Vianney Carlson..    Plan   Condition: Stable.    Disposition: Admit time  5/26/2020 02:20:00, Place in Observation Unit.    Counseled: Patient,  Regarding diagnosis, Regarding diagnostic results, Patient indicated understanding of instructions, Reason for admission was discussed.

## 2022-05-03 NOTE — HISTORICAL [facility] CERNER
This is a historical note converted from [employer]. Formatting and pictures may have been removed.  Please reference [employer] for original formatting and attached multimedia. Admit and Discharge Dates  Admit Date: 05/26/2020  Discharge Date: 05/29/2020  Physicians  Attending Physician - Aldo WILLIAM, Vianney GREGG  Admitting Physician - Aldo WILLIAM, Vianney GREGG  Consulting Physician - Sana WILLIAM, Roger  Consulting Physician - Brenda CEDEÑO, Manjit MORE  Consulting Physician - Geovanna WILLIAM, Trav  Primary Care Physician - James WILLIAM, [place]  Discharge Diagnosis  1.?Near syncope?R55  2.?Bradycardia?R00.1  3.?Acute neck pain?M54.2  4.?Diabetes mellitus type 2?E11.9  5.?Hypothyroidism?E03.4  6.?VIVIAN (obstructive sleep apnea)?G47.33,?VIVIAN on CPAP?G47.33  7.?Osteoarthritis of left knee?M17.0  8.?Transaminitis?R74.0  ?GUAN?  9.?History of gastric bypass?Z98.84  10.?Diastolic dysfunction?I51.89  ?LVEF 60%>45%  12.?Morbid obesity?E66.01  Hospital Course   is a 50-year-old WM with a past medical?history including gastric bypass, hypothyroidism and type 2 diabetes mellitus.? He presented to the emergency department complaining of acute right posterior neck pain radiating to right occiput.? Onset was after standing up.? Associated symptoms of near syncope.? Reports that he stumbled forward and fell.? Reports no loss of consciousness.? Later developed sharp substernal chest pain radiating to his back.? Also notes vague symptoms of generalized weakness and fatigue.? Reports negative stress test earlier this year.? On arrival to the ED he was hemodynamically stable.? Somnolent on exam otherwise no focal neuro deficits.? Later noted petechial rash on right ankle concerning for meningitis.? No meningeal signs on exam.? CT head was negative for any acute intracranial abnormalities.? Laboratory work-up was unremarkable.? UDS negative. ?Initial troponin was negative, EKG without any acute?ischemic changes. ?Chest x-ray was clear. ?Hospital medicine was  consulted for observation.? CT angiograms head/neck, TTE and carotid US pending.? No spikes in fever since presentation.  ?   Workup is negative.? Pt evaluated by [facility] and?recommended invasive cardiac workup for cardiomyopathy with left heart cath, but?he declined at this point. ?Patient wishes to do outpatient ischemic workup.? They recommend Preventice Monitor for 2 weeks and to start lisinopril 20 mg PO daily.? Pt will follow up with Dr. TERRENCE Hood within 1 -2 weeks  Time Spent on discharge  35 minutes  Objective  Vitals & Measurements  T:?36.4? ?C (Oral)? TMIN:?36.4? ?C (Oral)? TMAX:?36.5? ?C (Oral)? HR:?57(Peripheral)? HR:?67(Monitored)? RR:?20? BP:?108/58? SpO2:?99%?  Physical Exam  General:?obese WM in no acute distress  Eye: PERRLA, EOMI, clear conjunctiva, eyelids normal  HEENT:?oropharynx without erythema/exudate, oropharynx and nasal mucosal surfaces moist  Neck: full range of motion, no thyromegaly or lymphadenopathy  Respiratory:?clear to auscultation bilaterally  Cardiovascular:?regular rate and rhythm  Gastrointestinal:?soft, non-tender, non-distended  Musculoskeletal:?hematoma of RLE  Neurologic: cranial nerves grossly intact, no signs of peripheral neurological deficit  Psychiatric: Cooperative  Patient Discharge Condition  stable  Discharge Disposition  home   Discharge Medication Reconciliation  Prescribed  lisinopril (lisinopril 20 mg oral tablet)?20 mg, Oral, Daily  Continue  acetaminophen-HYDROcodone (HYDROCODONE-ACETAMIN 7.5-325)  cyclobenzaprine (cyclobenzaprine 10 mg oral tablet)?See Instructions  fluticasone nasal (fluticasone 50 mcg/inh nasal spray)?See Instructions  levothyroxine (levothyroxine 75 mcg (0.075 mg) oral tablet)?75 mcg, Oral, Daily  Discontinue  nystatin topical (Nystop 100,000 units/g topical powder)?See Instructions  Education and Orders Provided  Syncope, Easy-to-Read  Discharge - 05/28/20 9:32:00 CDT, Home, after echo and MRI?  Discharge Activity - Activity as  Tolerated?  Discharge Diet - Cardiac?  Discharge - 05/29/20 8:43:00 CDT, Home?  Follow up  Maicol Sana, on 06/08/2020  ????  If you have any questions regarding your appointment or need to reschedule, please contact your physicians office.Spoke with Radha Ramirez, on 06/08/2020  ????  The Nurse Practitioner at Dr. Marquez office will be seeing you at this appointment as Dr. Ramirez does not have available follow up dates at the moment.If you have any questions regarding your appointment or need to reschedule, please contact your physicians office.Spoke with Sania

## 2022-05-03 NOTE — HISTORICAL [facility] CERNER
This is a historical note converted from [place]. Formatting and pictures may have been removed.  Please reference [place] for original formatting and attached multimedia. Chief Complaint  neck and chest pain muscle spasms starting 30 minutes  History of Present Illness  PCP:James WILLIAM, [place]  CODE STATUS: Full  ?   is a 50-year-old gentleman with a history including gastric bypass, hypothyroidism and type 2 diabetes mellitus.? He presented to the emergency department complaining of acute right posterior neck pain radiating to right occiput.? Onset was after standing up.? Associated symptoms of near syncope.? Reports stumbled forward falling on her chronic front of him.? Reports no loss of consciousness.? Later developed sharp substernal chest pain radiating to his back.? Also notes vague symptoms of generalized weakness and fatigue.? Reports negative stress test earlier this year.? On arrival to the ED he was hemodynamically stable.? Somnolent on exam otherwise no focal neuro deficits.? Later noted petechial rash on right ankle concerning for meningitis.? No meningeal signs on exam.? CT head was negative for any acute intracranial abnormalities.? Laboratory work-up was unremarkable.? UDS negative. ?Initial troponin was negative, EKG without any acute?ischemic changes. ?Chest x-ray was clear. ?Hospital medicine was consulted for observation.? CT angios head/neck, TTE and carotid US pending.? No spikes in fever since presentation.  Review of Systems  Except as documented all systems reviewed and negative  Physical Exam  Vitals & Measurements  T:?36.4? ?C (Oral)? TMIN:?36.4? ?C (Oral)? TMAX:?36.9? ?C (Oral)? HR:?55(Monitored)? RR:?16? BP:?136/80? BP:?140/63(Standing)? BP:?124/58(Supine)? SpO2:?98%? WT:?150?kg? BMI:?40.27?  General:?NAD, nontoxic appearing  Eye: PERRLA, clear conjunctiva  HENT:?moist mucus membranes, normocephalic  Neck: full range of motion, no lymphadenopathy  Respiratory:?clear to  auscultation bilaterally, no wheezes rales or rhonchi  Cardiovascular:?regular rate and rhythm without murmurs, gallops or rubs  Gastrointestinal:?soft, non-tender, non-distended, active bowel sounds  Genitourinary: no CVA tenderness to palpation  Musculoskeletal:?full range of motion of all extremities  Integumentary: no rashes or skin lesions present,?mild?petechial rash?bilateral lower extremities,?silver dollar sized hematoma medial distal?right lower extremity  Neurologic: cranial nerves intact, no signs of peripheral neurological deficit, motor/sensory function intact  ?   Medications (13) Active  Scheduled: (2)  enoxaparin 40mg/0.4ml Inj ?40 mg 0.4 mL, Subcutaneous, Daily  levothyroxine 0.025 mg Tab UD ?75 mcg 3 tab(s), Oral, Daily  Continuous: (0)  PRN: (11)  acetaminophen 500 mg Tab ?1,000 mg 2 tab(s), Oral, q6hr  acetaminophen 650 mg/20.3mL Liqu Adult UD ?650 mg 20.3 mL, Oral, q6hr  dextrose 50% abboj ?12.5 gm 25 mL, IV Push, As Directed  dextrose 50% abboj ?12.5 gm 25 mL, IV Push, Once  dextrose 50% abboj ?12.5 gm 25 mL, IV Push, As Directed  dextrose 50% abboj ?25 gm 50 mL, IV Push, As Directed  glucagon recombinant 1 mg Inj ?1 mg 1 EA, IM, q10min  glucagon recombinant 1 mg Inj ?1 mg 1 EA, IM, q10min  insulin lispro 100 units/mL - 10mL vial ?2-14 units, Subcutaneous, As Directed  ketorolac 30 mg/mL Sol ?15 mg 0.5 mL, IV Push, q6hr  ondansetron 2 mg/mL inj - 2mL ?4 mg 2 mL, IV Push, q4hr  Assessment/Plan  Near syncope  Atypical chest pain  Petechial?rash right ankle  Tension type headache  Transaminitis  Type 2 diabetes mellitus  Hypothyroidism  History of gastric bypass  ?  Plan:  ?  Remains afebrile, no leukocytosis  We will continue to?closely monitor  Monitor for changes in mental status  Consider?lumbar puncture?if any acute changes in mentation  Follow-up on blood cultures x2  No obvious source of infection, will defer anti-infective therapy  We will trend troponin and monitor on  telemetry  Follow-up on CTA head/neck, TTE and carotid US?results  Check TSH, consider checking B12?for deficiency  Sliding-scale insulin  Resume home meds as appropriate  ?  I, Patrick Strong PA-C, have seen and discussed this patients case with Dr.?M Sana WILLIAM  Please see addendum section and the rest of the note for further information on patient assessment and treatment  ?   I Dr JUSTO Hood assumed care of this patient today at?16:05  ?  For this patient encounter, I reviewed the NP/PA documentation, treatment plan and medical decision making; and I had face-to-face time with this patient  a. History?: Pt woke up this morning with neck and occipital pain. Then became weak and almost fell down  No h/o trauma. No fever, chills, SOB, cough or chest pain  b. Physical exam  Heart RRR  Lungs clear  + left shoulder TTP and decreased ROM  Neck good ROM  ?   c. Medical decision making  Pts labs and vitals reviewed  He is now awake and comfortable.  No signs of sepsis  Will check XR left shoulder and C-spine  Added po steroids, cannot take NSAIDs secondary to gastric sleeve  Cont sliding scale  Cont home po prn pain meds  Labs in am  ?   All diagnosis and differential diagnosis have been reviewed; assessment and plan has been documented; I have personally reviewed the daily vitals, labs and test results that are presently available; I have reviewed the patients medication list; I have reviewed the consulting providers response and recommendations. I have reviewed or attempted to review medical records based upon their availability.   Problem List/Past Medical History  Ongoing  Abnormal liver enzymes  Arthritis of knee  BMI 40.0-44.9, adult  Diabetes mellitus type 2  Hypothyroidism  Low back pain  VIVIAN (obstructive sleep apnea)  Osteoarthritis of left knee  Prostate cancer screening  RSD (reflex sympathetic dystrophy)  Historical  Asthma  Balanitis  BMI 45.0-49.9, adult  Diabetes  Elevated TSH  Hematuria  Influenza  Need  for Tdap vaccination  Osteoarthritis  Procedure/Surgical History  Rt. Carpal tunnel (1996)  gastric sleeve  right hand surgery   Medications  Inpatient  acetaminophen, 650 mg= 20.3 mL, Oral, q6hr, PRN  acetaminophen, 1000 mg= 2 tab(s), Oral, q6hr, PRN  Dextrose 50% and Water (50 mL vial/syringe), 12.5 gm= 25 mL, IV Push, Once, PRN  Dextrose 50% and Water (50 mL vial/syringe), 12.5 gm= 25 mL, IV Push, As Directed, PRN  Dextrose 50% and Water (50 mL vial/syringe), 25 gm= 50 mL, IV Push, As Directed, PRN  Dextrose 50% in Water intravenous solution, 12.5 gm= 25 mL, IV Push, As Directed, PRN  glucagon, 1 mg= 1 EA, IM, q10min, PRN  glucagon, 1 mg= 1 EA, IM, q10min, PRN  insulin lispro, 2-14 units, Subcutaneous, As Directed, PRN  Lovenox 40 mg/0.4 mL subcutaneous solution, 40 mg= 0.4 mL, Subcutaneous, Daily  Zofran, 4 mg= 2 mL, IV Push, q4hr, PRN  Home  cyclobenzaprine 10 mg oral tablet, See Instructions, 1 refills,? ?Not taking  fluticasone 50 mcg/inh nasal spray, See Instructions,? ?Not taking  HYDROCODONE-ACETAMIN 7.5-325  levothyroxine 75 mcg (0.075 mg) oral tablet, 75 mcg= 1 tab(s), Oral, Daily, 3 refills  Nystop 100,000 units/g topical powder, See Instructions, 6 refills,? ?Not Taking, Completed Rx  Allergies  No Known Allergies  No Known Medication Allergies  Social History  Abuse/Neglect  No, 05/26/2020  No, 04/21/2020  Alcohol - Denies Alcohol Use, 10/03/2015  Never, 10/03/2015  Employment/School  disabled, 10/29/2015  Exercise  Exercise frequency: 3-4 times/week. Exercise type: pool therapy with weights., 02/15/2016  Home/Environment  Lives with Children, Spouse. Living situation: Home/Independent., 02/15/2016  Nutrition/Health  Regular, 10/29/2015  Sexual  Sexually active: Yes., 10/29/2015  Substance Use  Never, 10/29/2015  Tobacco - Denies Tobacco Use, 10/03/2015  Never (less than 100 in lifetime), N/A, 05/26/2020  Never (less than 100 in lifetime), N/A, Household tobacco concerns: No. Smokeless Tobacco Use:  Never., 04/21/2020  Family History  Ovarian cancer: Mother.  Father: History is unknown  Immunizations  Vaccine Date Status   tetanus/diphtheria/pertussis, acel(Tdap) 06/21/2017 Given   Lab Results  Labs?(Last four charted values)  WBC ?????8.7???(MAY 26)  Hgb ?????14.8???(MAY 26)  Hct ?????43.0???(MAY 26)  Plt ?????155???(MAY 26)  Na ?????140???(MAY 26)  K ?????3.9???(MAY 26)  CO2 ?????24???(MAY 26)  Cl ?????107???(MAY 26)  Cr ?????0.76???(MAY 26)  BUN ?????23.3???(MAY 26)  Glucose Random ???????H?132???(MAY 26)  Diagnostic Results  Accession:?[id number]  Order:?US Carotid  Report Dt/Tm:?05/26/2020 12:01  Report:?  ?  History.?  Syncope  ?  Reference.?  None available.  ?  Technique.?  Real-time images, spectral and color pulsed doppler were performed of  both carotid bifurcations and of both vertebral arteries. ?NASCET  criteria utilized.  ?  Findings.  There is no significant plaquing of the carotid systems bilaterally.  ?  There is antegrade flow in both vertebral arteries.?  ?  Velocities.  Right CCA PSV equals 74 cm/sec  Right ICA PSV equals 94 cm/sec  Right ICA/CCA PSV ratio equals 1.3.  ?  Left CCA PSV equals 91 cm/sec  Left ICA PSV equals 94 cm/sec.  Left ICA/CCA PSV ratio equals 1.0.  ?  Impression:  No significant carotid stenosis.  ?  ?  ?  ?  Accession:?[id number]  Order:?CT Head W/O Contrast  Report Dt/Tm:?05/26/2020 08:02  Report:?  ?  CLINICAL: Near-syncope, rule out bleed  ?  TECHNIQUE: Sequential axial images were performed of the brain without  contrast.?  ?  Dose product length of 1089 mGycm. Automated exposure control was  utilized to minimize radiation dose.  ?  COMPARISON: None available.?  ?  FINDINGS: ?There is no intracranial mass effect, midline shift,  hydrocephalus or hemorrhage. There is no sulcal effacement or low  attenuation changes to suggest recent large vessel territory  infarction. ?There is no acute extra axial fluid collection.  Visualized paranasal sinuses are clear  without mucosal thickening,  polypoidal abnormality or air-fluid levels. Mastoid air cells aeration  is optimal.?  ?  IMPRESSION:  ?  No acute intracranial findings identified.  ?  Accession:?[id number]  Order:?XR Chest 1 View  Report Dt/Tm:?05/26/2020 07:05  Report:?  ?  CLINICAL: ?Chest pain.  ?  COMPARISON: April 29, 2018.  ?  FINDINGS: ?Cardiopericardial silhouette is within normal limits.?  Lungs are without dense focal or segmental consolidation, congestion,  pleural effusion or pneumothorax. ?  ?  IMPRESSION:  ?  No acute cardiopulmonary process identified.  ?  ?  ?  Accession:?[id number]  Order:?CT Angio Neck W Contrast  Report Dt/Tm:?05/26/2020 06:48  Report:?  TECHNIQUE:CT ANGIOGRAM OF THE INTRACRANIAL VESSELS WAS PERFORMED  WITHOUT AND WITH INTRAVENOUS CONTRAST WITH DIRECT AXIAL AS WELL AS  SAGITTAL AND CORONAL REFORMATIONS. CT ANGIOGRAM OF THE NECK VESSELS  WAS PERFORMED WITHOUT AND WITH INTRAVENOUS CONTRAST WITH DIRECT AXIAL  AS WELL AS SAGITTAL AND CORONAL REFORMATIONS.  ?  COMPARISON:COMPARISON IS WITH CT HEAD STUDY DONE EARLIER TODAY.  ?  HISTORY:NECK PAIN/HEAD PAIN.  ?  Carotid arteries were assessed in accordance with the NASCET criteria.  ?  Findings:  Intracranial Vascular structures:  Internal carotid arteries:Mild atheromatous calcification of the  cavernous segment of the left internal carotid artery is seen. The  left internal carotid artery is otherwise unremarkable. The right  internal carotid artery is unremarkable.  Middle cerebral arteries:Unremarkable.  Anterior cerebral arteries:Hypoplasia of A1 segment of the left the  anterior cerebral artery is seen. The A2 segment of the left anterior  cerebral artery is unremarkable. The right anterior cerebral artery is  unremarkable.  Vertebral arteries:Left vertebral artery is dominant. The  vertebrobasilar junction is unremarkable.  Basilar artery:Unremarkable.  Posterior cerebral arteries:Unremarkable.  Neck Vascular structures:The  visualized aorta and origin of the great  vessels of the neck appear unremarkable.  Carotids:  Common carotid arteries:The right and the left common carotid arteries  appear unremarkable.  Internal carotid artery:The right and the left internal carotid  arteries appear unremarkable.  Vertebral arteries:Left vertebral artery is dominant. Both vertebral  arteries are normal in caliber and patent.  Brain parenchyma:No abnormal brain parenchymal enhancement is seen.  ?  Miscellaneous:Degenerative changes are present in the spine.  ?  Impression:  ?  No flow limiting stenosis identified.  ?  No significant discrepancy with overnight report.

## 2022-11-04 ENCOUNTER — DOCUMENTATION ONLY (OUTPATIENT)
Dept: ADMINISTRATIVE | Facility: HOSPITAL | Age: 52
End: 2022-11-04
Payer: MEDICARE

## 2023-01-19 ENCOUNTER — HOSPITAL ENCOUNTER (OUTPATIENT)
Dept: RADIOLOGY | Facility: HOSPITAL | Age: 53
Discharge: HOME | End: 2023-01-19
Payer: MEDICARE

## 2023-01-19 DIAGNOSIS — M79.672 PAIN IN LEFT FOOT: Primary | ICD-10-CM

## 2023-01-19 DIAGNOSIS — M79.672 PAIN IN LEFT FOOT: ICD-10-CM

## 2023-01-19 PROCEDURE — 73630 X-RAY EXAM OF FOOT: CPT | Mod: TC,LT

## 2025-03-13 ENCOUNTER — HOSPITAL ENCOUNTER (INPATIENT)
Facility: HOSPITAL | Age: 55
LOS: 4 days | Discharge: HOME | DRG: 193 | End: 2025-03-17
Attending: EMERGENCY MEDICINE | Admitting: INTERNAL MEDICINE
Payer: MEDICARE

## 2025-03-13 DIAGNOSIS — I82.402 LEG DVT (DEEP VENOUS THROMBOEMBOLISM), ACUTE, LEFT: ICD-10-CM

## 2025-03-13 DIAGNOSIS — I48.91 NEW ONSET A-FIB: Primary | ICD-10-CM

## 2025-03-13 DIAGNOSIS — R07.9 CHEST PAIN: ICD-10-CM

## 2025-03-13 DIAGNOSIS — I48.91 ATRIAL FIBRILLATION WITH RVR: ICD-10-CM

## 2025-03-13 DIAGNOSIS — E11.9 TYPE 2 DIABETES, DIET CONTROLLED: ICD-10-CM

## 2025-03-13 DIAGNOSIS — J90 BILATERAL PLEURAL EFFUSION: ICD-10-CM

## 2025-03-13 DIAGNOSIS — R07.9 CHEST PAIN, UNSPECIFIED TYPE: ICD-10-CM

## 2025-03-13 DIAGNOSIS — R10.9 ABDOMINAL PAIN, UNSPECIFIED ABDOMINAL LOCATION: ICD-10-CM

## 2025-03-13 DIAGNOSIS — J18.9 PNEUMONIA DUE TO INFECTIOUS ORGANISM, UNSPECIFIED LATERALITY, UNSPECIFIED PART OF LUNG: ICD-10-CM

## 2025-03-13 DIAGNOSIS — R00.1 BRADYCARDIA: ICD-10-CM

## 2025-03-13 LAB
ALBUMIN SERPL-MCNC: 2.2 G/DL (ref 3.5–5)
ALBUMIN/GLOB SERPL: 0.6 RATIO (ref 1.1–2)
ALP SERPL-CCNC: 95 UNIT/L (ref 40–150)
ALT SERPL-CCNC: 43 UNIT/L (ref 0–55)
ANION GAP SERPL CALC-SCNC: 17 MMOL/L (ref 8–16)
ANION GAP SERPL CALC-SCNC: 8 MEQ/L
AST SERPL-CCNC: 84 UNIT/L (ref 5–34)
BACTERIA #/AREA URNS AUTO: ABNORMAL /HPF
BASOPHILS # BLD AUTO: 0.07 X10(3)/MCL
BASOPHILS NFR BLD AUTO: 0.7 %
BILIRUB SERPL-MCNC: 1.7 MG/DL
BILIRUB UR QL STRIP.AUTO: NEGATIVE
BNP BLD-MCNC: 83.5 PG/ML
BUN SERPL-MCNC: 12 MG/DL (ref 8.4–25.7)
BUN SERPL-MCNC: 13 MG/DL (ref 6–30)
CALCIUM SERPL-MCNC: 7.7 MG/DL (ref 8.4–10.2)
CHLORIDE SERPL-SCNC: 103 MMOL/L (ref 98–107)
CHLORIDE SERPL-SCNC: 99 MMOL/L (ref 95–110)
CK SERPL-CCNC: 52 U/L (ref 30–200)
CLARITY UR: CLEAR
CO2 SERPL-SCNC: 22 MMOL/L (ref 22–29)
COLOR UR AUTO: YELLOW
CREAT SERPL-MCNC: 0.6 MG/DL (ref 0.5–1.4)
CREAT SERPL-MCNC: 0.64 MG/DL (ref 0.72–1.25)
CREAT/UREA NIT SERPL: 19
EOSINOPHIL # BLD AUTO: 0.29 X10(3)/MCL (ref 0–0.9)
EOSINOPHIL NFR BLD AUTO: 2.9 %
ERYTHROCYTE [DISTWIDTH] IN BLOOD BY AUTOMATED COUNT: 13.2 % (ref 11.5–17)
FLUAV AG UPPER RESP QL IA.RAPID: NOT DETECTED
FLUBV AG UPPER RESP QL IA.RAPID: NOT DETECTED
GFR SERPLBLD CREATININE-BSD FMLA CKD-EPI: >60 ML/MIN/1.73/M2
GLOBULIN SER-MCNC: 3.8 GM/DL (ref 2.4–3.5)
GLUCOSE SERPL-MCNC: 115 MG/DL (ref 74–100)
GLUCOSE SERPL-MCNC: 142 MG/DL (ref 70–110)
GLUCOSE UR QL STRIP: NORMAL
HCT VFR BLD AUTO: 43 % (ref 42–52)
HCT VFR BLD CALC: 42 %PCV (ref 36–54)
HGB BLD-MCNC: 14 G/DL
HGB BLD-MCNC: 14.1 G/DL (ref 14–18)
HGB UR QL STRIP: NEGATIVE
IMM GRANULOCYTES # BLD AUTO: 0.05 X10(3)/MCL (ref 0–0.04)
IMM GRANULOCYTES NFR BLD AUTO: 0.5 %
KETONES UR QL STRIP: NEGATIVE
LACTATE SERPL-SCNC: 1.7 MMOL/L (ref 0.5–2.2)
LEUKOCYTE ESTERASE UR QL STRIP: NEGATIVE
LIPASE SERPL-CCNC: 40 U/L
LYMPHOCYTES # BLD AUTO: 1.66 X10(3)/MCL (ref 0.6–4.6)
LYMPHOCYTES NFR BLD AUTO: 16.6 %
MAGNESIUM SERPL-MCNC: 1.9 MG/DL (ref 1.6–2.6)
MCH RBC QN AUTO: 33 PG (ref 27–31)
MCHC RBC AUTO-ENTMCNC: 32.8 G/DL (ref 33–36)
MCV RBC AUTO: 100.7 FL (ref 80–94)
MONOCYTES # BLD AUTO: 1.55 X10(3)/MCL (ref 0.1–1.3)
MONOCYTES NFR BLD AUTO: 15.5 %
MUCOUS THREADS URNS QL MICRO: ABNORMAL /LPF
NEUTROPHILS # BLD AUTO: 6.4 X10(3)/MCL (ref 2.1–9.2)
NEUTROPHILS NFR BLD AUTO: 63.8 %
NITRITE UR QL STRIP: NEGATIVE
NRBC BLD AUTO-RTO: 0 %
OHS QRS DURATION: 94 MS
OHS QTC CALCULATION: 512 MS
PH UR STRIP: 7 [PH]
PLATELET # BLD AUTO: 207 X10(3)/MCL (ref 130–400)
PMV BLD AUTO: 9.5 FL (ref 7.4–10.4)
POC IONIZED CALCIUM: 0.97 MMOL/L (ref 1.06–1.42)
POC TCO2 (MEASURED): 24 MMOL/L (ref 23–29)
POCT GLUCOSE: 118 MG/DL (ref 70–110)
POTASSIUM BLD-SCNC: 3.6 MMOL/L (ref 3.5–5.1)
POTASSIUM SERPL-SCNC: 3.8 MMOL/L (ref 3.5–5.1)
PROT SERPL-MCNC: 6 GM/DL (ref 6.4–8.3)
PROT UR QL STRIP: NEGATIVE
RBC # BLD AUTO: 4.27 X10(6)/MCL (ref 4.7–6.1)
RBC #/AREA URNS AUTO: ABNORMAL /HPF
RSV A 5' UTR RNA NPH QL NAA+PROBE: NOT DETECTED
SAMPLE: ABNORMAL
SARS-COV-2 RNA RESP QL NAA+PROBE: NOT DETECTED
SODIUM BLD-SCNC: 135 MMOL/L (ref 136–145)
SODIUM SERPL-SCNC: 133 MMOL/L (ref 136–145)
SP GR UR STRIP.AUTO: >1.05 (ref 1–1.03)
SQUAMOUS #/AREA URNS LPF: ABNORMAL /HPF
TROPONIN I SERPL-MCNC: <0.01 NG/ML (ref 0–0.04)
TSH SERPL-ACNC: 1.85 UIU/ML (ref 0.35–4.94)
UROBILINOGEN UR STRIP-ACNC: >12
WBC # BLD AUTO: 10.02 X10(3)/MCL (ref 4.5–11.5)
WBC #/AREA URNS AUTO: ABNORMAL /HPF

## 2025-03-13 PROCEDURE — 96375 TX/PRO/DX INJ NEW DRUG ADDON: CPT

## 2025-03-13 PROCEDURE — 93010 ELECTROCARDIOGRAM REPORT: CPT | Mod: ,,, | Performed by: INTERNAL MEDICINE

## 2025-03-13 PROCEDURE — 84443 ASSAY THYROID STIM HORMONE: CPT | Performed by: EMERGENCY MEDICINE

## 2025-03-13 PROCEDURE — 25000003 PHARM REV CODE 250: Performed by: EMERGENCY MEDICINE

## 2025-03-13 PROCEDURE — 83690 ASSAY OF LIPASE: CPT | Performed by: EMERGENCY MEDICINE

## 2025-03-13 PROCEDURE — 87040 BLOOD CULTURE FOR BACTERIA: CPT | Performed by: EMERGENCY MEDICINE

## 2025-03-13 PROCEDURE — 93005 ELECTROCARDIOGRAM TRACING: CPT

## 2025-03-13 PROCEDURE — 0241U COVID/RSV/FLU A&B PCR: CPT | Performed by: EMERGENCY MEDICINE

## 2025-03-13 PROCEDURE — 80053 COMPREHEN METABOLIC PANEL: CPT | Performed by: EMERGENCY MEDICINE

## 2025-03-13 PROCEDURE — 99285 EMERGENCY DEPT VISIT HI MDM: CPT | Mod: 25

## 2025-03-13 PROCEDURE — 96365 THER/PROPH/DIAG IV INF INIT: CPT

## 2025-03-13 PROCEDURE — 25500020 PHARM REV CODE 255: Performed by: EMERGENCY MEDICINE

## 2025-03-13 PROCEDURE — 85025 COMPLETE CBC W/AUTO DIFF WBC: CPT | Performed by: EMERGENCY MEDICINE

## 2025-03-13 PROCEDURE — 83605 ASSAY OF LACTIC ACID: CPT | Performed by: EMERGENCY MEDICINE

## 2025-03-13 PROCEDURE — 80047 BASIC METABLC PNL IONIZED CA: CPT

## 2025-03-13 PROCEDURE — 63600175 PHARM REV CODE 636 W HCPCS: Performed by: NURSE PRACTITIONER

## 2025-03-13 PROCEDURE — 84484 ASSAY OF TROPONIN QUANT: CPT | Performed by: EMERGENCY MEDICINE

## 2025-03-13 PROCEDURE — 11000001 HC ACUTE MED/SURG PRIVATE ROOM

## 2025-03-13 PROCEDURE — 63600175 PHARM REV CODE 636 W HCPCS: Performed by: EMERGENCY MEDICINE

## 2025-03-13 PROCEDURE — 83735 ASSAY OF MAGNESIUM: CPT | Performed by: EMERGENCY MEDICINE

## 2025-03-13 PROCEDURE — 21400001 HC TELEMETRY ROOM

## 2025-03-13 PROCEDURE — 82550 ASSAY OF CK (CPK): CPT | Performed by: EMERGENCY MEDICINE

## 2025-03-13 PROCEDURE — 96361 HYDRATE IV INFUSION ADD-ON: CPT

## 2025-03-13 PROCEDURE — 81015 MICROSCOPIC EXAM OF URINE: CPT | Performed by: EMERGENCY MEDICINE

## 2025-03-13 PROCEDURE — 96374 THER/PROPH/DIAG INJ IV PUSH: CPT | Mod: 59

## 2025-03-13 PROCEDURE — 25000003 PHARM REV CODE 250: Performed by: NURSE PRACTITIONER

## 2025-03-13 PROCEDURE — 83880 ASSAY OF NATRIURETIC PEPTIDE: CPT | Performed by: EMERGENCY MEDICINE

## 2025-03-13 RX ORDER — INSULIN ASPART 100 [IU]/ML
0-5 INJECTION, SOLUTION INTRAVENOUS; SUBCUTANEOUS
Status: DISCONTINUED | OUTPATIENT
Start: 2025-03-13 | End: 2025-03-17 | Stop reason: HOSPADM

## 2025-03-13 RX ORDER — IPRATROPIUM BROMIDE AND ALBUTEROL SULFATE 2.5; .5 MG/3ML; MG/3ML
3 SOLUTION RESPIRATORY (INHALATION) EVERY 4 HOURS PRN
Status: DISCONTINUED | OUTPATIENT
Start: 2025-03-13 | End: 2025-03-14

## 2025-03-13 RX ORDER — PROCHLORPERAZINE EDISYLATE 5 MG/ML
5 INJECTION INTRAMUSCULAR; INTRAVENOUS EVERY 6 HOURS PRN
Status: DISCONTINUED | OUTPATIENT
Start: 2025-03-13 | End: 2025-03-13

## 2025-03-13 RX ORDER — ENOXAPARIN SODIUM 100 MG/ML
40 INJECTION SUBCUTANEOUS EVERY 24 HOURS
Status: DISCONTINUED | OUTPATIENT
Start: 2025-03-13 | End: 2025-03-14

## 2025-03-13 RX ORDER — IBUPROFEN 200 MG
16 TABLET ORAL
Status: DISCONTINUED | OUTPATIENT
Start: 2025-03-13 | End: 2025-03-17 | Stop reason: HOSPADM

## 2025-03-13 RX ORDER — CEFTRIAXONE 2 G/1
2 INJECTION, POWDER, FOR SOLUTION INTRAMUSCULAR; INTRAVENOUS
Status: COMPLETED | OUTPATIENT
Start: 2025-03-13 | End: 2025-03-13

## 2025-03-13 RX ORDER — NALOXONE HCL 0.4 MG/ML
0.02 VIAL (ML) INJECTION
Status: DISCONTINUED | OUTPATIENT
Start: 2025-03-13 | End: 2025-03-17 | Stop reason: HOSPADM

## 2025-03-13 RX ORDER — SODIUM CHLORIDE 0.9 % (FLUSH) 0.9 %
10 SYRINGE (ML) INJECTION
Status: DISCONTINUED | OUTPATIENT
Start: 2025-03-13 | End: 2025-03-17 | Stop reason: HOSPADM

## 2025-03-13 RX ORDER — IBUPROFEN 200 MG
24 TABLET ORAL
Status: DISCONTINUED | OUTPATIENT
Start: 2025-03-13 | End: 2025-03-17 | Stop reason: HOSPADM

## 2025-03-13 RX ORDER — DIATRIZOATE MEGLUMINE AND DIATRIZOATE SODIUM 660; 100 MG/ML; MG/ML
15 SOLUTION ORAL; RECTAL
Status: COMPLETED | OUTPATIENT
Start: 2025-03-13 | End: 2025-03-13

## 2025-03-13 RX ORDER — GLUCAGON 1 MG
1 KIT INJECTION
Status: DISCONTINUED | OUTPATIENT
Start: 2025-03-13 | End: 2025-03-17 | Stop reason: HOSPADM

## 2025-03-13 RX ORDER — ACETAMINOPHEN 325 MG/1
650 TABLET ORAL EVERY 4 HOURS PRN
Status: DISCONTINUED | OUTPATIENT
Start: 2025-03-13 | End: 2025-03-17 | Stop reason: HOSPADM

## 2025-03-13 RX ORDER — CEFTRIAXONE 1 G/1
1 INJECTION, POWDER, FOR SOLUTION INTRAMUSCULAR; INTRAVENOUS
Status: DISCONTINUED | OUTPATIENT
Start: 2025-03-14 | End: 2025-03-17 | Stop reason: HOSPADM

## 2025-03-13 RX ORDER — ACETAMINOPHEN 500 MG
1000 TABLET ORAL EVERY 6 HOURS PRN
Status: DISCONTINUED | OUTPATIENT
Start: 2025-03-13 | End: 2025-03-17 | Stop reason: HOSPADM

## 2025-03-13 RX ORDER — ONDANSETRON HYDROCHLORIDE 2 MG/ML
4 INJECTION, SOLUTION INTRAVENOUS EVERY 4 HOURS PRN
Status: DISCONTINUED | OUTPATIENT
Start: 2025-03-13 | End: 2025-03-13

## 2025-03-13 RX ADMIN — DIATRIZOATE MEGLUMINE AND DIATRIZOATE SODIUM 15 ML: 660; 100 LIQUID ORAL; RECTAL at 11:03

## 2025-03-13 RX ADMIN — IOHEXOL 100 ML: 350 INJECTION, SOLUTION INTRAVENOUS at 11:03

## 2025-03-13 RX ADMIN — ACETAMINOPHEN 1000 MG: 500 TABLET ORAL at 08:03

## 2025-03-13 RX ADMIN — SODIUM CHLORIDE, POTASSIUM CHLORIDE, SODIUM LACTATE AND CALCIUM CHLORIDE 1000 ML: 600; 310; 30; 20 INJECTION, SOLUTION INTRAVENOUS at 09:03

## 2025-03-13 RX ADMIN — AZITHROMYCIN MONOHYDRATE 500 MG: 500 INJECTION, POWDER, LYOPHILIZED, FOR SOLUTION INTRAVENOUS at 12:03

## 2025-03-13 RX ADMIN — DILTIAZEM HYDROCHLORIDE 5 MG/HR: 5 INJECTION, SOLUTION INTRAVENOUS at 11:03

## 2025-03-13 RX ADMIN — ENOXAPARIN SODIUM 40 MG: 40 INJECTION SUBCUTANEOUS at 04:03

## 2025-03-13 RX ADMIN — SODIUM CHLORIDE, POTASSIUM CHLORIDE, SODIUM LACTATE AND CALCIUM CHLORIDE 1000 ML: 600; 310; 30; 20 INJECTION, SOLUTION INTRAVENOUS at 11:03

## 2025-03-13 RX ADMIN — CEFTRIAXONE SODIUM 2 G: 2 INJECTION, POWDER, FOR SOLUTION INTRAMUSCULAR; INTRAVENOUS at 12:03

## 2025-03-13 NOTE — ED PROVIDER NOTES
Encounter Date: 3/13/2025    SCRIBE #1 NOTE: I, Raymond Muro, am scribing for, and in the presence of,  Enrique Dominguez III, MD. I have scribed the following portions of the note - the EKG reading. Other sections scribed: HPI, ROS, and PE.       History     Chief Complaint   Patient presents with    Chest Pain     Presents via [facility] with c/o chest pain onset x1 week. Also reports LE swelling and SOB. Denies medical problems. Given 324mg ASA and NTG.     Naeem Muro is a 54 y.o. male patient with a PMHx of depression, cirrhosis of liver, and PTSD who presents to the ED for evaluation of LUQ abdominal pain and chest pain which onset gradually 1 week ago. Per EMS, patient was sinus rhythm in 80-90's upon arrival. Pain worsens with deep breaths and movement. En route, pt became tachycardic 130-160's HR with Afib on EKG. Pt also reports episodes of coughing up blood. Associated symptoms include leg swelling beginning today, shortness of breath, subjective fever, and chills. Patient denies any nausea and vomiting.       The history is provided by the patient. No  was used.     Review of patient's allergies indicates:   Allergen Reactions    Venom-wasp Swelling     Past Medical History:   Diagnosis Date    Personal history of colonic polyps 12/29/2020     No past surgical history on file.  No family history on file.  Social History[1]  Review of Systems   Constitutional:  Positive for chills and fever (subjective). Negative for fatigue and unexpected weight change.   HENT:  Negative for congestion and rhinorrhea.    Eyes:  Negative for pain.   Respiratory:  Positive for shortness of breath. Negative for chest tightness and wheezing.    Cardiovascular:  Positive for chest pain (worse w deep breaths and movement) and leg swelling.   Gastrointestinal:  Positive for abdominal pain (LUQ). Negative for constipation, diarrhea, nausea and vomiting.   Genitourinary:  Negative for dysuria.   Musculoskeletal:   Negative for back pain and neck pain.   Skin:  Negative for rash.   Allergic/Immunologic: Negative for environmental allergies, food allergies and immunocompromised state.   Neurological:  Negative for dizziness and speech difficulty.   Hematological:  Does not bruise/bleed easily.   Psychiatric/Behavioral:  Negative for sleep disturbance and suicidal ideas.        Physical Exam     Initial Vitals [25 0924]   BP Pulse Resp Temp SpO2   116/72 (!) 161 (!) 22 98.1 °F (36.7 °C) 99 %      MAP       --         Physical Exam    Nursing note and vitals reviewed.  Constitutional: No distress.   HENT:   Head: Normocephalic and atraumatic.   Neck: Trachea normal.   Cardiovascular:  Normal rate and regular rhythm.           No murmur heard.  Pulmonary/Chest: No respiratory distress. He has rales.   Rales bilateral bases, L>R.   Abdominal: Abdomen is soft. Bowel sounds are normal. He exhibits no distension. There is abdominal tenderness (mild) in the left upper quadrant. There is no rebound and no guarding.   Musculoskeletal:         General: Normal range of motion.      Lumbar back: Normal.      Right lower le+ Edema present.      Left lower le+ Edema present.     Neurological: He is alert and oriented to person, place, and time. He has normal strength. No cranial nerve deficit.   Skin: Skin is warm and dry. No rash noted.   Psychiatric: He has a normal mood and affect. Judgment normal.         ED Course   Critical Care    Date/Time: 3/13/2025 12:23 PM    Performed by: Enrique Dominguez III, MD  Authorized by: Enrique Dominguez III, MD  Direct patient critical care time: 45 minutes  Documentation critical care time: 5 minutes  Consulting other physicians critical care time: 5 minutes  Total critical care time (exclusive of procedural time) : 55 minutes  Critical care was necessary to treat or prevent imminent or life-threatening deterioration of the following conditions: metabolic crisis and cardiac  failure.  Critical care was time spent personally by me on the following activities: discussions with primary provider, discussions with consultants, examination of patient, re-evaluation of patient's condition, review of old charts, ordering and review of laboratory studies and ordering and performing treatments and interventions.        Labs Reviewed   CBC WITH DIFFERENTIAL - Abnormal       Result Value    WBC 10.02      RBC 4.27 (*)     Hgb 14.1      Hct 43.0      .7 (*)     MCH 33.0 (*)     MCHC 32.8 (*)     RDW 13.2      Platelet 207      MPV 9.5      Neut % 63.8      Lymph % 16.6      Mono % 15.5      Eos % 2.9      Basophil % 0.7      Imm Grans % 0.5      Neut # 6.40      Lymph # 1.66      Mono # 1.55 (*)     Eos # 0.29      Baso # 0.07      Imm Gran # 0.05 (*)     NRBC% 0.0     COMPREHENSIVE METABOLIC PANEL - Abnormal    Sodium 133 (*)     Potassium 3.8      Chloride 103      CO2 22      Glucose 115 (*)     Blood Urea Nitrogen 12.0      Creatinine 0.64 (*)     Calcium 7.7 (*)     Protein Total 6.0 (*)     Albumin 2.2 (*)     Globulin 3.8 (*)     Albumin/Globulin Ratio 0.6 (*)     Bilirubin Total 1.7 (*)     ALP 95      ALT 43      AST 84 (*)     eGFR >60      Anion Gap 8.0      BUN/Creatinine Ratio 19     ISTAT CHEM8 - Abnormal    POC Glucose 142 (*)     POC BUN 13      POC Creatinine 0.6      POC Sodium 135 (*)     POC Potassium 3.6      POC Chloride 99      POC TCO2 (MEASURED) 24      POC Anion Gap 17 (*)     POC Ionized Calcium 0.97 (*)     POC Hematocrit 42      POC HEMOGLOBIN 14      Sample VENOUS     COVID/RSV/FLU A&B PCR - Normal    Influenza A PCR Not Detected      Influenza B PCR Not Detected      Respiratory Syncytial Virus PCR Not Detected      SARS-CoV-2 PCR Not Detected      Narrative:     The Xpert Xpress SARS-CoV-2/FLU/RSV plus is a rapid, multiplexed real-time PCR test intended for the simultaneous qualitative detection and differentiation of SARS-CoV-2, Influenza A, Influenza B, and  respiratory syncytial virus (RSV) viral RNA in either nasopharyngeal swab or nasal swab specimens.         LIPASE - Normal    Lipase Level 40     MAGNESIUM - Normal    Magnesium Level 1.90     B-TYPE NATRIURETIC PEPTIDE - Normal    Natriuretic Peptide 83.5     LACTIC ACID, PLASMA - Normal    Lactic Acid Level 1.7     TROPONIN I - Normal    Troponin-I <0.010     CK - Normal    Creatine Kinase 52     TSH - Normal    TSH 1.853     BLOOD CULTURE [facility]   BLOOD CULTURE [facility]   CBC W/ AUTO DIFFERENTIAL    Narrative:     The following orders were created for panel order CBC auto differential.  Procedure                               Abnormality         Status                     ---------                               -----------         ------                     CBC with Differential[[id number]]       Abnormal            Final result                 Please view results for these tests on the individual orders.   URINALYSIS, REFLEX TO URINE CULTURE   ISTAT CHEM8     EKG Readings: (Independently Interpreted)   Initial Reading: No STEMI. Rhythm: Atrial Fibrillation. Heart Rate: 160. Ectopy: No Ectopy. Conduction: Normal. ST Segments: Normal ST Segments. T Waves: Normal. Axis: Normal. Clinical Impression: Atrial Fibrillation with RVR   0919.     ECG Results              EKG 12-lead (Final result)        Collection Time Result Time QRS Duration OHS QTC Calculation    03/13/25 09:19:31 03/13/25 12:38:35 94 512                     Final result by Interface, Lab In [facility] (03/13/25 12:38:40)                   Narrative:    Test Reason : R07.9,    Vent. Rate : 160 BPM     Atrial Rate :    BPM     P-R Int :    ms          QRS Dur :  94 ms      QT Int : 314 ms       P-R-T Axes :    -11  75 degrees    QTcB Int : 512 ms    Atrial fibrillation with rapid ventricular response  Cannot rule out Anterior infarct ,age undetermined  Abnormal ECG  No previous ECGs available  Confirmed by Ricardo Carlton ([id number]) on 3/13/2025 12:38:32 PM    Referred  By: AAAREFERRAL SELF           Confirmed By: Ricardo Carlton                                  Imaging Results              CT Chest Abdomen Pelvis With IV Contrast (XPD) Oral Contrast for GI Bypass (Final result)  Result time 03/13/25 11:59:44      Final result by Fox Lao MD (03/13/25 11:59:44)                   Impression:      Bilateral consolidation in the peripheral lower lobes appears partially atelectasis, but pneumonia or aspiration also possible.    Cirrhosis.  Small pleural effusions.      Electronically signed by: Fox Lao  Date:    03/13/2025  Time:    11:59               Narrative:    EXAMINATION:  CT CHEST ABDOMEN PELVIS WITH IV CONTRAST (XPD)    CLINICAL HISTORY:  Severe left upper quadrant abdominal pain but also having chest pain with hemoptysis;    TECHNIQUE:  CT imaging from the chest through the pelvis after IV contrast.  Axial, coronal and sagittal reformatted images reviewed. Dose length product 2365 mGycm. Automatic exposure control, adjustment of mA/kV or iterative reconstruction technique used to limit radiation dose.    COMPARISON:  CT abdomen/pelvis 08/01/2021    FINDINGS:  Lung and large airways: Areas of peripheral consolidation in both lower lobes.    Pleura: Small bilateral pleural effusions.    Mediastinum and lauren: Normal heart size. No pericardial effusion.  Minimally enlarged right paraesophageal lymph node measuring 12 mm short axis similar since 2021.    Chest wall/axilla and lower neck: No significant findings.    Liver/biliary: Cirrhosis.  No suspicious liver lesion identified on single phase exam.  No biliary dilatation.    Pancreas: Normal.    Spleen: Normal.    Adrenals: Normal.    Genitourinary: Symmetric enhancement of the kidneys.  Several parapelvic renal cysts.  No significant ureteral dilatation.  Bladder within normal limits.    Stomach/Bowel: Prior partial gastrectomy.  No bowel obstruction.  No discernible sites of bowel inflammation.    Abdominal/pelvic  lymph nodes and peritoneum: No pathologically enlarged lymph node identified. No ascites, free air or fluid collection.    Abdominal/pelvic vasculature: Normal caliber of the abdominal aorta.  Moderate caliber splenorenal shunt.    Abdominal wall: Small fat containing umbilical hernia.    Musculoskeletal: No acute osseous findings.                                       X-Ray Chest 1 View (Final result)  Result time 03/13/25 11:01:21      Final result by Neema Michael MD (03/13/25 11:01:21)                   Impression:      Patchy bibasilar airspace with small pleural effusions.      Electronically signed by: Neema Michael  Date:    03/13/2025  Time:    11:01               Narrative:    EXAMINATION:  XR CHEST 1 VIEW    CLINICAL HISTORY:  cough;    TECHNIQUE:  AP chest    COMPARISON:  Chest x-ray dated 08/01/2021    FINDINGS:  The heart is stable in size.  There are patchy bibasilar airspace opacities with small pleural effusions.  There is no visible pneumothorax.                                       Medications   diltiaZEM (CARDIZEM) 125 mg in 0.9% NaCl 125 mL infusion (5 mg/hr Intravenous New Bag 3/13/25 1134)   lactated ringers bolus 1,000 mL (0 mLs Intravenous Stopped 3/13/25 1122)   lactated ringers bolus 1,000 mL (0 mLs Intravenous Stopped 3/13/25 1237)   diatrizoate meglumineand-diatrizoate sodium (GASTROVIEW) solution 15 mL (15 mLs Oral Given 3/13/25 1122)   iohexoL (OMNIPAQUE 350) injection 100 mL (100 mLs Intravenous Given 3/13/25 1121)   cefTRIAXone injection 2 g (2 g Intravenous Given 3/13/25 1236)   azithromycin (ZITHROMAX) 500 mg in 0.9% NaCl 250 mL IVPB (admixture device) (0 mg Intravenous Stopped 3/13/25 1405)     Medical Decision Making  The differential diagnosis includes, but is not limited to, pneumonia, gastric perforation, new onset afib, and acute coronary syndrome.     Patient arrived with new onset AFib RVR was given IV fluids and then started on Cardizem patient did have 1  isolated low blood pressure states he has felt febrile in his had a cough was some possible blood in it although only specks no anticoagulants also having left upper quadrant abdominal pain his chest x-ray and CT shows atelectasis but can not rule out pneumonia will start on Rocephin and Zithromax patient        Problems Addressed:  Abdominal pain, unspecified abdominal location: complicated acute illness or injury with systemic symptoms that poses a threat to life or bodily functions  Chest pain, unspecified type: complicated acute illness or injury with systemic symptoms that poses a threat to life or bodily functions    Amount and/or Complexity of Data Reviewed  Labs: ordered.  Radiology: ordered and independent interpretation performed.  ECG/medicine tests: ordered and independent interpretation performed.     Details: 0919. No STEMI. Rhythm: Atrial Fibrillation. Heart Rate: 160. Ectopy: No Ectopy. Conduction: Normal. ST Segments: Normal ST Segments. T Waves: Normal. Axis: Normal. Clinical Impression: Atrial Fibrillation with RVR       Risk  Prescription drug management.  Decision regarding hospitalization.            Scribe Attestation:   Scribe #1: I performed the above scribed service and the documentation accurately describes the services I performed. I attest to the accuracy of the note.    Attending Attestation:           Physician Attestation for Scribe:  Physician Attestation Statement for Scribe #1: I, Enrique Dominguez III, MD, reviewed documentation, as scribed by Raymond Muro in my presence, and it is both accurate and complete.             ED Course as of 03/13/25 1410   Thu Mar 13, 2025   [id number] Paged HM.  [GG]   1224 Patient with atrial fibrillation with rapid ventricular response was given fluids but then started on Cardizem moderation of his AFib.  Patient has had a cough with some hemoptysis over the last few days his white count is 10 is flu COVID negative BNP also normal will start IV antibiotics  for polyp flu atelectasis versus pneumonia patient complaining of left upper quadrant abdominal pain [FK]      ED Course User Index  [FK] Enrique Dominguez III, MD  [GG] Raymond Muro                           Clinical Impression:  Final diagnoses:  [I48.91] New onset a-fib  [J18.9] Pneumonia due to infectious organism, unspecified laterality, unspecified part of lung  [R07.9] Chest pain, unspecified type  [R10.9] Abdominal pain, unspecified abdominal location          ED Disposition Condition    Admit Stable                  [1]         Enrique Dominguez III, MD  03/13/25 [id number]

## 2025-03-13 NOTE — H&P
[facility] Hospital Medicine History & Physical Examination       Patient Name: Naeem Muro  MRN: [id number]  Patient Class: IP- Inpatient   Admission Date: 3/13/2025   Admitting Physician: DARRYN Service   Length of Stay: 0  Attending Physician: Dr Andrade Gauthier  Primary Care Provider: Claribel Rasmussen FNP-C  Face-to-Face encounter date: 03/13/2025  Code Status: Full code  Chief Complaint: Chest Pain (Presents via [facility] with c/o chest pain onset x1 week. Also reports LE swelling and SOB. Denies medical problems. Given 324mg ASA and NTG.)        Screening for Social Drivers for health:  Patient screened for food insecurity, housing instability, transportation needs, utility difficulties, and interpersonal safety (select all that apply as identified as concern)  []Housing or Food  []Transportation Needs  []Utility Difficulties  []Interpersonal safety  [x]None    Patient information was obtained from patient, patient's family, past medical records and/or ER records.     HISTORY OF PRESENT ILLNESS:   Naeem Muro is a 54 y.o. male who  PMH includes anxiety, depression, cirrhosis of the liver, PTSD, DM type 2, HLD hypothyroidism, peripheral neuropathy; chronic arthritis with chronic lower back pain; presents to the ED at [facility] on 3/13/2025 with a primary complaint of LUQ abdominal pain and chest pain which onset gradually 1 week ago.  Patient reports his symptoms progressed over the past couple of days prompting him to call EMS for ED transport.  Per EMS, patient was sinus rhythm in 80-90's upon arrival. Pain worsens with deep breaths and movement. En route, pt became tachycardic 130-160's HR with Afib on EKG. Pt also reports episodes of coughing up blood tinged sputum.  He also reports shortness a breath, chills, subjective fever with associated lower extremity swelling.  Reports of nausea, vomiting, diarrhea.  Lab work reviewed demonstrated  sodium 133, glucose 115, AST 84; other  indices unremarkable.  Chest x-ray impression reviewed demonstrated patchy bibasilar airspace with small pleural effusion.  CT of the chest abdomen and pelvis with IV contrast impression reviewed demonstrated several parapelvic renal cysts, prior partial gastrectomy, no bowel obstructions; no ascites free air or fluid collection, bilateral consolidation in the peripheral lower lobes, cirrhosis small pleural effusion.  Initial vital signs /72 pulse 161 respirations 24 temperature 98.1° F O2 saturation 99% on room air.  Patient did have a drop in his blood pressure in the ED which he responded to IV hydration.  Patient was given IV hydration, received Cardizem bolus with initiation of Cardizem infusion.  Patient is started on IV ceftriaxone and azithromycin therapy.  Patient heart rate had improved at the time of examination.  Patient is admitted to hospital medicine services for further management.  Cardiology Services have been consulted.      PAST MEDICAL HISTORY:   Cirrhosis of the liver   PTSD  DM type 2   HTN   HLD  Hypothyroidism   Peripheral neuropathy   Chronic lower back pain   Chronic arthritis     PAST SURGICAL HISTORY:   Endoscopy   Angiogram    ALLERGIES:   Venom-wasp    FAMILY HISTORY:   Reviewed and negative    SOCIAL HISTORY:     Social History     Tobacco Use    Smoking status: Not on file    Smokeless tobacco: Not on file   Substance Use Topics    Alcohol use: Not on file        HOME MEDICATIONS:   No reports of alcohol, tobacco, or illicit drug use   Lives with family     REVIEW OF SYSTEMS:   Except as documented, all other systems reviewed and negative     PHYSICAL EXAM:     VITAL SIGNS: 24 HRS MIN & MAX LAST   Temp  Min: 98.1 °F (36.7 °C)  Max: 98.1 °F (36.7 °C) 98.1 °F (36.7 °C)   BP  Min: 88/58  Max: 130/84 99/67   Pulse  Min: 72  Max: 161  89   Resp  Min: 20  Max: 24 (!) 22   SpO2  Min: 94 %  Max: 99 % 96 %       General appearance: Well-developed, well-nourished male chronically  ill-appearing looks older than stated age; fatigued, mild respiratory distress noted   HENT: Atraumatic head. Moist mucous membranes of oral cavity.  Eyes:  PERRL  Lungs:  Diminished bilaterally scattered crackles with coarse breath sounds, mildly labored respirations O2 saturation stable  Heart: Regular rate and rhythm. S1 and S2 present capillary refill brisk, peripheral pulses palpable, edema lower extremities   Abdomen: Soft, non-distended, non-tender. No rebound tenderness/guarding. Bowel sounds are normal.   Extremities: No cyanosis, clubbing, BLE edema  Skin: warm and dry   Neuro: oriented no acute focal deficits  Psych/mental status: flat affect, cooperative    LABS AND IMAGING:     Recent Labs   Lab 03/13/25 0940 03/13/25 0944   WBC  --  10.02   RBC  --  4.27*   HGB  --  14.1   HCT 42 43.0   MCV  --  100.7*   MCH  --  33.0*   MCHC  --  32.8*   RDW  --  13.2   PLT  --  207   MPV  --  9.5       Recent Labs   Lab 03/13/25 0944 03/13/25  1119   NA  --  133*   K  --  3.8   CL  --  103   CO2  --  22   BUN  --  12.0   CREATININE  --  0.64*   CALCIUM  --  7.7*   MG 1.90  --    ALBUMIN  --  2.2*   ALKPHOS  --  95   ALT  --  43   AST  --  84*   BILITOT  --  1.7*       Microbiology Results (last 7 days)       Procedure Component Value Units Date/Time    Blood culture #2 **CANNOT BE ORDERED STAT** [[id number]] Collected: 03/13/25 0942    Order Status: Resulted Specimen: Blood Updated: 03/13/25 1008    Blood culture #1 **CANNOT BE ORDERED STAT** [[id number]] Collected: 03/13/25 0942    Order Status: Resulted Specimen: Blood Updated: 03/13/25 0955             CT Chest Abdomen Pelvis With IV Contrast (XPD) Oral Contrast for GI Bypass  Narrative: EXAMINATION:  CT CHEST ABDOMEN PELVIS WITH IV CONTRAST (XPD)    CLINICAL HISTORY:  Severe left upper quadrant abdominal pain but also having chest pain with hemoptysis;    TECHNIQUE:  CT imaging from the chest through the pelvis after IV contrast.  Axial, coronal and sagittal  reformatted images reviewed. Dose length product 2365 mGycm. Automatic exposure control, adjustment of mA/kV or iterative reconstruction technique used to limit radiation dose.    COMPARISON:  CT abdomen/pelvis 08/01/2021    FINDINGS:  Lung and large airways: Areas of peripheral consolidation in both lower lobes.    Pleura: Small bilateral pleural effusions.    Mediastinum and lauren: Normal heart size. No pericardial effusion.  Minimally enlarged right paraesophageal lymph node measuring 12 mm short axis similar since 2021.    Chest wall/axilla and lower neck: No significant findings.    Liver/biliary: Cirrhosis.  No suspicious liver lesion identified on single phase exam.  No biliary dilatation.    Pancreas: Normal.    Spleen: Normal.    Adrenals: Normal.    Genitourinary: Symmetric enhancement of the kidneys.  Several parapelvic renal cysts.  No significant ureteral dilatation.  Bladder within normal limits.    Stomach/Bowel: Prior partial gastrectomy.  No bowel obstruction.  No discernible sites of bowel inflammation.    Abdominal/pelvic lymph nodes and peritoneum: No pathologically enlarged lymph node identified. No ascites, free air or fluid collection.    Abdominal/pelvic vasculature: Normal caliber of the abdominal aorta.  Moderate caliber splenorenal shunt.    Abdominal wall: Small fat containing umbilical hernia.    Musculoskeletal: No acute osseous findings.  Impression: Bilateral consolidation in the peripheral lower lobes appears partially atelectasis, but pneumonia or aspiration also possible.    Cirrhosis.  Small pleural effusions.    Electronically signed by: Fox Lao  Date:    03/13/2025  Time:    11:59  X-Ray Chest 1 View  Narrative: EXAMINATION:  XR CHEST 1 VIEW    CLINICAL HISTORY:  cough;    TECHNIQUE:  AP chest    COMPARISON:  Chest x-ray dated 08/01/2021    FINDINGS:  The heart is stable in size.  There are patchy bibasilar airspace opacities with small pleural effusions.  There is no  visible pneumothorax.  Impression: Patchy bibasilar airspace with small pleural effusions.    Electronically signed by: Neema Michael  Date:    03/13/2025  Time:    11:01        ASSESSMENT & PLAN:   ASSESSMENT:  Acute chest pain-rule out-POA   Atrial fibrillation with RVR-new onset-POA   Pneumonia bilateral-CAP- POA  Hypovolemic hypotension-POA  Hyponatremia-POA  Dm type 2 with hyperglycemia-POA  Exertional dyspnea with shortness a breath-POA   Weakness- POA    Hx of  anxiety, depression, cirrhosis of the liver, PTSD, HTN, HLD hypothyroidism, peripheral neuropathy; chronic arthritis with chronic lower back pain    PLAN:  Consult Cardiology Services appreciate assistance and recommendations   DuoNeb treatments q.4 hours PRN   Incentive spirometry q.2 hours while awake   Continue with IV ceftriaxone and azithromycin therapy   Follow cultures and sensitivities   Resume home medication as deemed necessary   Repeat lab work in a.m.   PT OT eval and treat      VTE Prophylaxis:  Lovenox for DVT prophylaxis and will be advised to be as mobile as possible and sit in a chair as tolerated    Patient condition:  Stable  __________________________________________________________________________  INPATIENT LIST OF MEDICATIONS     Scheduled Meds:   [START ON 3/14/2025] azithromycin  500 mg Intravenous Q24H    [START ON 3/14/2025] cefTRIAXone (Rocephin) IV (PEDS and ADULTS)  1 g Intravenous Q24H    enoxparin  40 mg Subcutaneous Daily     Continuous Infusions:   dilTIAZem  5 mg/hr Intravenous Continuous 5 mL/hr at 03/13/25 1134 5 mg/hr at 03/13/25 1134     PRN Meds:.  Current Facility-Administered Medications:     acetaminophen, 1,000 mg, Oral, Q6H PRN    acetaminophen, 650 mg, Oral, Q4H PRN    albuterol-ipratropium, 3 mL, Nebulization, Q4H PRN    dextrose 50%, 12.5 g, Intravenous, PRN    dextrose 50%, 25 g, Intravenous, PRN    glucagon (human recombinant), 1 mg, Intramuscular, PRN    glucose, 16 g, Oral, PRN    glucose, 24 g,  Oral, PRN    insulin aspart U-100, 0-5 Units, Subcutaneous, QID (AC + HS) PRN    naloxone, 0.02 mg, Intravenous, PRN    sodium chloride 0.9%, 10 mL, Intravenous, PRN      Geena BLANCO FNP have reviewed and discussed the case with   Dr. Andrade Gauthier.  Please see the following addendum for further assessment and plan from their attending MD. This note was created with a assistance of electronic voice recognition software.  There may be transcription errors as a result of using this technology however minimal; and effort has been made to assure accuracy of the transcription but any obvious areas or admissions should be clarified with the author of the document       OSVALDO Beal   03/13/2025    ________________________________________________________________________________    MD Addendum:  Dr. BELLA ---assumed care of this patient today at ---am/pm  For the patient encounter, I performed the substantive portion of the visit, I reviewed the NP/PA documentation, treatment plan, and medical decision making.  I had face to face time with this patient     A. History:    B. Physical exam:    C. Medical decision making:    Discharge Planning and Disposition: No mobility needs. Ambulating well. Good social support system.   Anticipated discharge    All diagnosis and differential diagnosis have been reviewed; assessment and plan has been documented; I have personally reviewed the labs and test results that are presently available; I have reviewed the patients medication list; I have reviewed the consulting providers response and recommendations. I have reviewed or attempted to review medical records based upon their availability.    All of the patient and family questions have been addressed and answered. Patient's is agreeable to the above stated plan. I will continue to monitor closely and make adjustments to medical management as needed.

## 2025-03-14 LAB
ALBUMIN SERPL-MCNC: 2.2 G/DL (ref 3.5–5)
ALBUMIN/GLOB SERPL: 0.7 RATIO (ref 1.1–2)
ALP SERPL-CCNC: 91 UNIT/L (ref 40–150)
ALT SERPL-CCNC: 39 UNIT/L (ref 0–55)
ANION GAP SERPL CALC-SCNC: 9 MEQ/L
AST SERPL-CCNC: 62 UNIT/L (ref 5–34)
B PERT.PT PRMT NPH QL NAA+NON-PROBE: NOT DETECTED
BASOPHILS # BLD AUTO: 0.08 X10(3)/MCL
BASOPHILS NFR BLD AUTO: 1 %
BILIRUB SERPL-MCNC: 1.6 MG/DL
BUN SERPL-MCNC: 10.6 MG/DL (ref 8.4–25.7)
C PNEUM DNA NPH QL NAA+NON-PROBE: NOT DETECTED
CALCIUM SERPL-MCNC: 7.7 MG/DL (ref 8.4–10.2)
CHLORIDE SERPL-SCNC: 104 MMOL/L (ref 98–107)
CO2 SERPL-SCNC: 26 MMOL/L (ref 22–29)
CREAT SERPL-MCNC: 0.62 MG/DL (ref 0.72–1.25)
CREAT/UREA NIT SERPL: 17
D DIMER PPP IA.FEU-MCNC: 4.71 UG/ML FEU (ref 0–0.5)
EOSINOPHIL # BLD AUTO: 0.37 X10(3)/MCL (ref 0–0.9)
EOSINOPHIL NFR BLD AUTO: 4.8 %
ERYTHROCYTE [DISTWIDTH] IN BLOOD BY AUTOMATED COUNT: 13 % (ref 11.5–17)
EST. AVERAGE GLUCOSE BLD GHB EST-MCNC: 96.8 MG/DL
GFR SERPLBLD CREATININE-BSD FMLA CKD-EPI: >60 ML/MIN/1.73/M2
GLOBULIN SER-MCNC: 3.3 GM/DL (ref 2.4–3.5)
GLUCOSE SERPL-MCNC: 94 MG/DL (ref 74–100)
HADV DNA NPH QL NAA+NON-PROBE: NOT DETECTED
HBA1C MFR BLD: 5 %
HCOV 229E RNA NPH QL NAA+NON-PROBE: NOT DETECTED
HCOV HKU1 RNA NPH QL NAA+NON-PROBE: NOT DETECTED
HCOV NL63 RNA NPH QL NAA+NON-PROBE: NOT DETECTED
HCOV OC43 RNA NPH QL NAA+NON-PROBE: NOT DETECTED
HCT VFR BLD AUTO: 41.8 % (ref 42–52)
HGB BLD-MCNC: 14.4 G/DL (ref 14–18)
HMPV RNA NPH QL NAA+NON-PROBE: NOT DETECTED
HPIV1 RNA NPH QL NAA+NON-PROBE: NOT DETECTED
HPIV2 RNA NPH QL NAA+NON-PROBE: NOT DETECTED
HPIV3 RNA NPH QL NAA+NON-PROBE: NOT DETECTED
HPIV4 RNA NPH QL NAA+NON-PROBE: NOT DETECTED
IMM GRANULOCYTES # BLD AUTO: 0.05 X10(3)/MCL (ref 0–0.04)
IMM GRANULOCYTES NFR BLD AUTO: 0.7 %
LYMPHOCYTES # BLD AUTO: 1.61 X10(3)/MCL (ref 0.6–4.6)
LYMPHOCYTES NFR BLD AUTO: 21 %
M PNEUMO DNA NPH QL NAA+NON-PROBE: NOT DETECTED
MCH RBC QN AUTO: 33.1 PG (ref 27–31)
MCHC RBC AUTO-ENTMCNC: 34.4 G/DL (ref 33–36)
MCV RBC AUTO: 96.1 FL (ref 80–94)
MONOCYTES # BLD AUTO: 1.02 X10(3)/MCL (ref 0.1–1.3)
MONOCYTES NFR BLD AUTO: 13.3 %
NEUTROPHILS # BLD AUTO: 4.53 X10(3)/MCL (ref 2.1–9.2)
NEUTROPHILS NFR BLD AUTO: 59.2 %
NRBC BLD AUTO-RTO: 0 %
PLATELET # BLD AUTO: 190 X10(3)/MCL (ref 130–400)
PMV BLD AUTO: 9.4 FL (ref 7.4–10.4)
POCT GLUCOSE: 119 MG/DL (ref 70–110)
POTASSIUM SERPL-SCNC: 3.9 MMOL/L (ref 3.5–5.1)
PROT SERPL-MCNC: 5.5 GM/DL (ref 6.4–8.3)
RBC # BLD AUTO: 4.35 X10(6)/MCL (ref 4.7–6.1)
RSV RNA NPH QL NAA+NON-PROBE: NOT DETECTED
RV+EV RNA NPH QL NAA+NON-PROBE: NOT DETECTED
SODIUM SERPL-SCNC: 139 MMOL/L (ref 136–145)
WBC # BLD AUTO: 7.66 X10(3)/MCL (ref 4.5–11.5)

## 2025-03-14 PROCEDURE — 36415 COLL VENOUS BLD VENIPUNCTURE: CPT | Performed by: NURSE PRACTITIONER

## 2025-03-14 PROCEDURE — 83036 HEMOGLOBIN GLYCOSYLATED A1C: CPT | Performed by: INTERNAL MEDICINE

## 2025-03-14 PROCEDURE — 63600175 PHARM REV CODE 636 W HCPCS: Performed by: INTERNAL MEDICINE

## 2025-03-14 PROCEDURE — 94640 AIRWAY INHALATION TREATMENT: CPT

## 2025-03-14 PROCEDURE — 97165 OT EVAL LOW COMPLEX 30 MIN: CPT

## 2025-03-14 PROCEDURE — 87798 DETECT AGENT NOS DNA AMP: CPT | Performed by: INTERNAL MEDICINE

## 2025-03-14 PROCEDURE — 99900031 HC PATIENT EDUCATION (STAT)

## 2025-03-14 PROCEDURE — 80053 COMPREHEN METABOLIC PANEL: CPT | Performed by: NURSE PRACTITIONER

## 2025-03-14 PROCEDURE — 63600175 PHARM REV CODE 636 W HCPCS: Performed by: NURSE PRACTITIONER

## 2025-03-14 PROCEDURE — 21400001 HC TELEMETRY ROOM

## 2025-03-14 PROCEDURE — 11000001 HC ACUTE MED/SURG PRIVATE ROOM

## 2025-03-14 PROCEDURE — 25000242 PHARM REV CODE 250 ALT 637 W/ HCPCS: Performed by: INTERNAL MEDICINE

## 2025-03-14 PROCEDURE — 85379 FIBRIN DEGRADATION QUANT: CPT | Performed by: NURSE PRACTITIONER

## 2025-03-14 PROCEDURE — 97161 PT EVAL LOW COMPLEX 20 MIN: CPT

## 2025-03-14 PROCEDURE — 25000003 PHARM REV CODE 250: Performed by: NURSE PRACTITIONER

## 2025-03-14 PROCEDURE — 85025 COMPLETE CBC W/AUTO DIFF WBC: CPT | Performed by: NURSE PRACTITIONER

## 2025-03-14 PROCEDURE — 25500020 PHARM REV CODE 255: Performed by: INTERNAL MEDICINE

## 2025-03-14 PROCEDURE — 94760 N-INVAS EAR/PLS OXIMETRY 1: CPT

## 2025-03-14 PROCEDURE — 25000003 PHARM REV CODE 250: Performed by: INTERNAL MEDICINE

## 2025-03-14 PROCEDURE — 36415 COLL VENOUS BLD VENIPUNCTURE: CPT | Performed by: INTERNAL MEDICINE

## 2025-03-14 RX ORDER — LEVALBUTEROL INHALATION SOLUTION 0.63 MG/3ML
0.63 SOLUTION RESPIRATORY (INHALATION) EVERY 6 HOURS PRN
Status: DISCONTINUED | OUTPATIENT
Start: 2025-03-14 | End: 2025-03-17 | Stop reason: HOSPADM

## 2025-03-14 RX ORDER — METOPROLOL TARTRATE 25 MG/1
25 TABLET, FILM COATED ORAL 2 TIMES DAILY
Status: DISCONTINUED | OUTPATIENT
Start: 2025-03-14 | End: 2025-03-17 | Stop reason: HOSPADM

## 2025-03-14 RX ORDER — FAMOTIDINE 20 MG/1
20 TABLET, FILM COATED ORAL 2 TIMES DAILY
Status: DISCONTINUED | OUTPATIENT
Start: 2025-03-14 | End: 2025-03-17 | Stop reason: HOSPADM

## 2025-03-14 RX ORDER — POTASSIUM CHLORIDE 750 MG/1
10 TABLET, EXTENDED RELEASE ORAL ONCE
Status: COMPLETED | OUTPATIENT
Start: 2025-03-14 | End: 2025-03-14

## 2025-03-14 RX ORDER — FUROSEMIDE 10 MG/ML
40 INJECTION INTRAMUSCULAR; INTRAVENOUS ONCE
Status: COMPLETED | OUTPATIENT
Start: 2025-03-14 | End: 2025-03-14

## 2025-03-14 RX ORDER — METOPROLOL TARTRATE 1 MG/ML
5 INJECTION, SOLUTION INTRAVENOUS EVERY 4 HOURS PRN
Status: DISCONTINUED | OUTPATIENT
Start: 2025-03-14 | End: 2025-03-17 | Stop reason: HOSPADM

## 2025-03-14 RX ORDER — MAGNESIUM SULFATE HEPTAHYDRATE 40 MG/ML
2 INJECTION, SOLUTION INTRAVENOUS ONCE
Status: COMPLETED | OUTPATIENT
Start: 2025-03-14 | End: 2025-03-14

## 2025-03-14 RX ORDER — LEVALBUTEROL INHALATION SOLUTION 0.63 MG/3ML
0.63 SOLUTION RESPIRATORY (INHALATION)
Status: DISCONTINUED | OUTPATIENT
Start: 2025-03-14 | End: 2025-03-14

## 2025-03-14 RX ADMIN — FAMOTIDINE 20 MG: 20 TABLET, FILM COATED ORAL at 10:03

## 2025-03-14 RX ADMIN — AZITHROMYCIN MONOHYDRATE 500 MG: 500 INJECTION, POWDER, LYOPHILIZED, FOR SOLUTION INTRAVENOUS at 10:03

## 2025-03-14 RX ADMIN — FAMOTIDINE 20 MG: 20 TABLET, FILM COATED ORAL at 09:03

## 2025-03-14 RX ADMIN — METOPROLOL TARTRATE 25 MG: 25 TABLET, FILM COATED ORAL at 09:03

## 2025-03-14 RX ADMIN — IOHEXOL 87 ML: 350 INJECTION, SOLUTION INTRAVENOUS at 07:03

## 2025-03-14 RX ADMIN — METOPROLOL TARTRATE 25 MG: 25 TABLET, FILM COATED ORAL at 10:03

## 2025-03-14 RX ADMIN — LEVALBUTEROL HYDROCHLORIDE 0.63 MG: 0.63 SOLUTION RESPIRATORY (INHALATION) at 08:03

## 2025-03-14 RX ADMIN — POTASSIUM CHLORIDE 10 MEQ: 750 TABLET, FILM COATED, EXTENDED RELEASE ORAL at 10:03

## 2025-03-14 RX ADMIN — FUROSEMIDE 40 MG: 10 INJECTION, SOLUTION INTRAVENOUS at 10:03

## 2025-03-14 RX ADMIN — MAGNESIUM SULFATE HEPTAHYDRATE 2 G: 40 INJECTION, SOLUTION INTRAVENOUS at 10:03

## 2025-03-14 RX ADMIN — APIXABAN 10 MG: 5 TABLET, FILM COATED ORAL at 09:03

## 2025-03-14 RX ADMIN — APIXABAN 5 MG: 5 TABLET, FILM COATED ORAL at 10:03

## 2025-03-14 RX ADMIN — CEFTRIAXONE SODIUM 1 G: 1 INJECTION, POWDER, FOR SOLUTION INTRAMUSCULAR; INTRAVENOUS at 12:03

## 2025-03-14 NOTE — PT/OT/SLP EVAL
Occupational Therapy   Evaluation and Discharge Note    Name: Naeem Muro  MRN: [id number]    Recommendations:     Discharge therapy intensity: No Therapy Indicated   Discharge Equipment Recommendations: none  Barriers to discharge:  None    Assessment:     Naeem Muro is a 54 y.o. male with a medical diagnosis of new onset of A-fib RVR. On eval, patient presents at baseline for all ADL tasks. Skilled OT services are not warranted at this time.    Plan:     OT to sign off as acute OT services are not warranted at this time.  Please re-consult if situation changes during this hospitalization.    Plan of Care Reviewed with: patient    Subjective     Chief Complaint: none  Patient/Family Comments/goals: go home    Occupational Profile:  Living Environment: lives with uncle; tub combo  Previous level of function: indep with ADLs  Roles and Routines: attends to his chickens  Equipment Used at Home: none  Assistance upon Discharge: uncle as needed    Pain/Comfort:  Pain Rating 1: 0/10    Patients cultural, spiritual, Judaism conflicts given the current situation: no    Objective:     OT communicated with nurse prior to session.      Patient was found HOB elevated with peripheral IV, telemetry, pulse ox (continuous) upon OT entry to room.    General Precautions: Standard, fall  Orthopedic Precautions: N/A  Braces: N/A    Vital Signs: HR: 70s  Respiratory Status: on room air    Bed Mobility:    Patient completed Supine to Sit with independence    Functional Mobility/Transfers:  Patient completed Sit <> Stand Transfer with independence  with  no assistive device   Patient completed Bed <> Chair Transfer using Step Transfer technique with independence with no assistive device  Patient completed Toilet Transfer Step Transfer technique with independence with  no AD  Functional Mobility: no LOB    Activities of Daily Living:  Grooming: independence able to perform oral care and wash face standing at sink with good  balance  Lower Body Dressing: independence dusty socks sitting EOB  Toileting: independence      [facility] Total Score: 24    Functional Cognition:  Intact    Visual Perceptual Skills:  Intact    Upper Extremity Function:  Right Upper Extremity:   WFL    Left Upper Extremity:  WFL    Balance:   Intact    Therapeutic Positioning  Risk for acquired pressure injuries is decreased due to intact sensation, continence, and ability to mobilize independently .    OT interventions performed during the course of today's session in an effort to prevent and/or reduce acquired pressure injuries:   Education was provided on benefits of and recommendations for therapeutic positioning    Skin assessment: full body skin assessment was performed    Findings: no redness or breakdown noted    OT recommendations for therapeutic positioning throughout hospitalization:   Follow [facility] Pressure Injury Prevention Protocol    Patient Education:  Patient provided with verbal education education regarding OT role/goals/POC, fall prevention, safety awareness, and Discharge/DME recommendations.  Understanding was verbalized.     Patient left up in chair with all lines intact, call button in reach, and nurse and friend present.    History:     Past Medical History:   Diagnosis Date    Personal history of colonic polyps 12/29/2020       No past surgical history on file.    Time Tracking:     OT Date of Treatment:    OT Start Time: 1023  OT Stop Time: 1042  OT Total Time (min): 19 min    Billable Minutes:Evaluation low    3/14/2025

## 2025-03-14 NOTE — PLAN OF CARE
03/14/25 0904   Discharge Assessment   Assessment Type Discharge Planning Assessment   Confirmed/corrected address, phone number and insurance Yes   Source of Information patient   When was your last doctors appointment?   (PCP is OSVALDO Garcia. Patient reports last PCP appointment was 1 year ago.)   Reason For Admission Chest Pain   People in Home other relative(s)  (Patient Lives with Uncle.)   Do you expect to return to your current living situation? Yes   Do you have help at home or someone to help you manage your care at home? Yes   Who are your caregiver(s) and their phone number(s)? Yohana/daughter/[phone]   Current cognitive status: Alert/Oriented   Walking or Climbing Stairs Difficulty no   Dressing/Bathing Difficulty no   Home Accessibility stairs to enter home   Number of Stairs, Main Entrance five   Home Layout Able to live on 1st floor   Equipment Currently Used at Home none   Readmission within 30 days? No   Do you currently have service(s) that help you manage your care at home? No   Do you take prescription medications? Yes   Do you have prescription coverage? Yes   Do you have any problems affording any of your prescribed medications? No   Who is going to help you get home at discharge? Family   How do you get to doctors appointments? family or friend will provide   Are you on dialysis? No   Do you take coumadin? No   Discharge Plan A Home   Discharge Plan B Home   Discharge Plan discussed with: Patient

## 2025-03-14 NOTE — PROGRESS NOTES
[facility] Hospital Medicine Progress Note        Chief Complaint: Inpatient Follow-up for AFib RVR    HPI per admitting team: Naeem Muro is a 54 y.o. male who  PMH includes anxiety, depression, cirrhosis of the liver, PTSD, DM type 2, HLD hypothyroidism, peripheral neuropathy; chronic arthritis with chronic lower back pain; presents to the ED at [facility] on 3/13/2025 with a primary complaint of LUQ abdominal pain and chest pain which onset gradually 1 week ago.  Patient reports his symptoms progressed over the past couple of days prompting him to call EMS for ED transport.  Per EMS, patient was sinus rhythm in 80-90's upon arrival. Pain worsens with deep breaths and movement. En route, pt became tachycardic 130-160's HR with Afib on EKG. Pt also reports episodes of coughing up blood tinged sputum.  He also reports shortness a breath, chills, subjective fever with associated lower extremity swelling.  Reports of nausea, vomiting, diarrhea.  Lab work reviewed demonstrated  sodium 133, glucose 115, AST 84; other indices unremarkable.  Chest x-ray impression reviewed demonstrated patchy bibasilar airspace with small pleural effusion.  CT of the chest abdomen and pelvis with IV contrast impression reviewed demonstrated several parapelvic renal cysts, prior partial gastrectomy, no bowel obstructions; no ascites free air or fluid collection, bilateral consolidation in the peripheral lower lobes, cirrhosis small pleural effusion.  Initial vital signs /72 pulse 161 respirations 24 temperature 98.1° F O2 saturation 99% on room air.  Patient did have a drop in his blood pressure in the ED which he responded to IV hydration.  Patient was given IV hydration, received Cardizem bolus with initiation of Cardizem infusion.  Patient is started on IV ceftriaxone and azithromycin therapy.  Patient heart rate had improved at the time of examination.  Patient is admitted to hospital medicine  services for further management.  Cardiology Services have been consulted    Interval Hx:   Patient is walking in the room and outside in the [place], no major complaints but had a lot of questions regarding his diagnosis why it happened will he have to take all the medication that when prescribed.  From the conversation it seems like he is against medications.  Informed patient that these medication will save his life and will prevent the complications if he takes it properly.  Did discuss if he wants to he can talk to his cardiologist regarding ablation as well as left atrial appendage ligation.  Patient reported he had a 2 weeks Holter monitor through [facility] prior to this event  I answered all his questions to the best of my knowledge and their satisfaction  Family is at bedside  Case was discussed with patient's nurse and  on the floor.    Objective/physical exam:  General: In no acute distress, afebrile, walks with a little limb due to arthritis in right knee  Chest:  Bilateral fine basal crepitation, on room air  Heart: RRR, +S1, S2, no appreciable murmur  Abdomen: Soft, nontender, BS +  MSK: Warm, 2+ pitting edema bilateral lower extremities   Neurologic: Alert and oriented x4, Cranial nerve II-XII intact, Strength 5/5 in all 4 extremities    VITAL SIGNS: 24 HRS MIN & MAX LAST   Temp  Min: 97.9 °F (36.6 °C)  Max: 98.6 °F (37 °C) 98.1 °F (36.7 °C)   BP  Min: 104/64  Max: 151/76 113/60   Pulse  Min: 66  Max: 109  72   Resp  Min: 16  Max: 26 18   SpO2  Min: 94 %  Max: 97 % 97 %     I reviewed the labs below:  Recent Labs   Lab 03/13/25  0940 03/13/25  0944 03/14/25 0427   WBC  --  10.02 7.66   RBC  --  4.27* 4.35*   HGB  --  14.1 14.4   HCT 42 43.0 41.8*   MCV  --  100.7* 96.1*   MCH  --  33.0* 33.1*   MCHC  --  32.8* 34.4   RDW  --  13.2 13.0   PLT  --  207 190   MPV  --  9.5 9.4     Recent Labs   Lab 03/13/25  0944 03/13/25  1119 03/14/25 0427   NA  --  133* 139   K  --  3.8 3.9   CL  --  103 104   CO2   --  22 26   BUN  --  12.0 10.6   CREATININE  --  0.64* 0.62*   CALCIUM  --  7.7* 7.7*   MG 1.90  --   --    ALBUMIN  --  2.2* 2.2*   ALKPHOS  --  95 91   ALT  --  43 39   AST  --  84* 62*   BILITOT  --  1.7* 1.6*     Microbiology Results (last 7 days)       Procedure Component Value Units Date/Time    Blood culture #1 **CANNOT BE ORDERED STAT** [[id number]]  (Normal) Collected: 03/13/25 0942    Order Status: Completed Specimen: Blood Updated: 03/14/25 1100     Blood Culture No Growth At 24 Hours    Blood culture #2 **CANNOT BE ORDERED STAT** [[id number]]  (Normal) Collected: 03/13/25 0942    Order Status: Completed Specimen: Blood Updated: 03/14/25 1100     Blood Culture No Growth At 24 Hours           See below for Radiology    Intake and Output:  Intake/Output Summary (Last 24 hours) at 3/14/2025 1719  Last data filed at 3/14/2025 1400  Gross per 24 hour   Intake 660 ml   Output 900 ml   Net -240 ml       Assessment/Plan:  Acute chest pain- ACS ruled out  Atrial fibrillation with RVR-new onset-POA - now in sinus rhythm  Dyspnea on mild exertion- POA   Bilateral consolidation in lower lobes-differentials include CAP vs atelectasis vs aspiration - POA  Hypovolemic hypotension- POA- resolved with hydration  Hyponatremia- POA- resolved   Dm type 2 with hyperglycemia- POA  Hx of  anxiety, depression, cirrhosis of the liver, PTSD, HTN, HLD hypothyroidism, peripheral neuropathy; chronic arthritis with chronic lower back pain, morbid obesity status post gastric sleeve, now BMI 33.1, history of obstructive sleep apnea resolved with weight loss       Cardiology on board, appreciate recommendation.  Cardizem drip discontinued in started on metoprolol 25 mg p.o. b.i.d..  Also started on Eliquis 5 mg p.o. b.i.d.  From the history I obtained from the patient, it seems that he maybe in and out of AFib for awhile, probably paroxysmal AFib  Patient received 1 dose of Lasix 40 mg IV x1 this morning,   Patient is still have  significant swelling to bilateral lower extremity, also advised to elevate the legs  Venous ultrasound bilateral lower extremities pending  Given bilateral lower lobe consolidation can not be ruled out, patient empirically started on IV ceftriaxone and azithromycin- day 2 of 5  No leukocytosis with WBC 7 K, afebrile  Blood cultures x 2- negative at 24 hours  Resp PCR - negative  Xopenex treatments q.6 hours PRN, avoid DuoNeb given AFib RVR  Incentive spirometry q.2 hours while awake   Insulin sliding scale, Accu-Cheks AC and HS  Will check A1c as an add on  Encourage patient to have sleep study as outpatient to rule out obstructive sleep apnea  No home meds  PT OT evaluated the patient, patient does not need therapy, he has been independent  Morning CBC, BMP, Mag ordered        VTE Prophylaxis:  Lovenox    Patient condition:  Fair    Anticipated discharge and Disposition:   Home hopefully in 1-2 days    All diagnosis and differential diagnosis have been reviewed; assessment and plan has been documented; I have personally reviewed the labs and test results that are presently available; I have reviewed the patients medication list; I have reviewed the consulting providers response and recommendations. I have reviewed or attempted to review medical records based upon their availability    All of the patient's questions have been  addressed and answered. Patient's is agreeable to the above stated plan. I will continue to monitor closely and make adjustments to medical management as needed.    Portions of this note dictated using EMR integrated voice recognition software, and may be subject to voice recognition errors not corrected at proofreading. Please contact writer for clarification if needed.   _____________________________________________________________________    Nutrition Status:  Nutrition consulted. Most recent weight and BMI monitored-     Measurements:  Wt Readings from Last 1 Encounters:   03/13/25 120.2  kg (265 lb)   Body mass index is 33.12 kg/m².    Patient has been screened and assessed by RD.    Malnutrition Type:  Context:    Level:      Malnutrition Characteristic Summary:       Interventions/Recommendations (treatment strategy):         A minimum of two characteristics is recommended for diagnosis of either severe or non-severe malnutrition.     Current Med:   apixaban  5 mg Oral BID    azithromycin  500 mg Intravenous Q24H    cefTRIAXone (Rocephin) IV (PEDS and ADULTS)  1 g Intravenous Q24H    famotidine  20 mg Oral BID    metoprolol tartrate  25 mg Oral BID      PRN meds:  Current Facility-Administered Medications:     acetaminophen, 1,000 mg, Oral, Q6H PRN    acetaminophen, 650 mg, Oral, Q4H PRN    dextrose 50%, 12.5 g, Intravenous, PRN    dextrose 50%, 25 g, Intravenous, PRN    glucagon (human recombinant), 1 mg, Intramuscular, PRN    glucose, 16 g, Oral, PRN    glucose, 24 g, Oral, PRN    insulin aspart U-100, 0-5 Units, Subcutaneous, QID (AC + HS) PRN    levalbuterol, 0.63 mg, Nebulization, Q6H PRN    metoprolol, 5 mg, Intravenous, Q4H PRN    naloxone, 0.02 mg, Intravenous, PRN    sodium chloride 0.9%, 10 mL, Intravenous, PRN    Continuous infusion:         Radiology:   I have personally reviewed the images and agree with radiologist report  CT Chest Abdomen Pelvis With IV Contrast (XPD) Oral Contrast for GI Bypass  Narrative: EXAMINATION:  CT CHEST ABDOMEN PELVIS WITH IV CONTRAST (XPD)    CLINICAL HISTORY:  Severe left upper quadrant abdominal pain but also having chest pain with hemoptysis;    TECHNIQUE:  CT imaging from the chest through the pelvis after IV contrast.  Axial, coronal and sagittal reformatted images reviewed. Dose length product 2365 mGycm. Automatic exposure control, adjustment of mA/kV or iterative reconstruction technique used to limit radiation dose.    COMPARISON:  CT abdomen/pelvis 08/01/2021    FINDINGS:  Lung and large airways: Areas of peripheral consolidation in both lower  lobes.    Pleura: Small bilateral pleural effusions.    Mediastinum and lauren: Normal heart size. No pericardial effusion.  Minimally enlarged right paraesophageal lymph node measuring 12 mm short axis similar since 2021.    Chest wall/axilla and lower neck: No significant findings.    Liver/biliary: Cirrhosis.  No suspicious liver lesion identified on single phase exam.  No biliary dilatation.    Pancreas: Normal.    Spleen: Normal.    Adrenals: Normal.    Genitourinary: Symmetric enhancement of the kidneys.  Several parapelvic renal cysts.  No significant ureteral dilatation.  Bladder within normal limits.    Stomach/Bowel: Prior partial gastrectomy.  No bowel obstruction.  No discernible sites of bowel inflammation.    Abdominal/pelvic lymph nodes and peritoneum: No pathologically enlarged lymph node identified. No ascites, free air or fluid collection.    Abdominal/pelvic vasculature: Normal caliber of the abdominal aorta.  Moderate caliber splenorenal shunt.    Abdominal wall: Small fat containing umbilical hernia.    Musculoskeletal: No acute osseous findings.  Impression: Bilateral consolidation in the peripheral lower lobes appears partially atelectasis, but pneumonia or aspiration also possible.    Cirrhosis.  Small pleural effusions.    Electronically signed by: Fox Lao  Date:    03/13/2025  Time:    11:59  X-Ray Chest 1 View  Narrative: EXAMINATION:  XR CHEST 1 VIEW    CLINICAL HISTORY:  cough;    TECHNIQUE:  AP chest    COMPARISON:  Chest x-ray dated 08/01/2021    FINDINGS:  The heart is stable in size.  There are patchy bibasilar airspace opacities with small pleural effusions.  There is no visible pneumothorax.  Impression: Patchy bibasilar airspace with small pleural effusions.    Electronically signed by: Neema Michael  Date:    03/13/2025  Time:    11:01        Nash Peraza MD  Department of Hospital Medicine   [facility]   03/14/2025

## 2025-03-14 NOTE — PT/OT/SLP EVAL
Physical Therapy Evaluation and Discharge Note    Patient Name:  Naeem Muro   MRN:  [id number]    Recommendations:     Discharge therapy intensity: No Therapy Indicated   Discharge Equipment Recommendations: none   Barriers to discharge: Ongoing medical needs    Assessment:     Naeem Muro is a 54 y.o. male admitted with a medical diagnosis of chest pain, AFIB RVR, hypotension.  At this time, patient is functioning at their prior level of function and does not require further acute PT services.     Recent Surgery: * No surgery found *      Plan:     During this hospitalization, patient does not require further acute PT services.  Please re-consult if situation changes.      Subjective     Chief Complaint: to go home  Patient/Family Comments/goals: to go home  Pain/Comfort:  Pain Rating 1: 0/10    Patients cultural, spiritual, Mu-ism conflicts given the current situation:      Living Environment:  Pt lives with uncle in a mobile home with 4 steps to enter.   Prior to admission, patients level of function was I.  Equipment used at home: none.  DME owned (not currently used): none.  Upon discharge, patient will have assistance from uncle.    Objective:     Communicated with RN prior to session.  Patient found up in chair with peripheral IV, telemetry, pulse ox (continuous) upon PT entry to room.    General Precautions: Standard,      Orthopedic Precautions:N/A   Braces: N/A  Respiratory Status: Room air  Blood Pressure:     Exams:  RLE Strength: WNL  LLE Strength: WNL    Functional Mobility:  Transfers:     Sit to Stand:  independence with no AD  Gait: Pt ambulated 350' without AD I. Able to manage his own IV pole I. No LOB or safety concerns.     AM-PAC 6 CLICK MOBILITY  Total Score:24       Treatment and Education:    Patient provided with verbal education education regarding PT role/goals/POC and discharge/DME recommendations.  Understanding was verbalized.     Patient left up in chair with all lines  intact, call button in reach, and RN notified.    GOALS:   Multidisciplinary Problems       Physical Therapy Goals       Not on file                    History:     Past Medical History:   Diagnosis Date    Personal history of colonic polyps 12/29/2020       No past surgical history on file.    Time Tracking:     PT Received On: 03/14/25  PT Start Time: 1314     PT Stop Time: 1324  PT Total Time (min): 10 min     Billable Minutes: Evaluation 10      03/14/2025

## 2025-03-14 NOTE — CONSULTS
Inpatient consult to Cardiology  Consult performed by: Zonia Boss FNP  Consult ordered by: Geena Gomez FNP  Reason for consult: AF RVR/CP        [facility]    Cardiology  Consult Note    Patient Name: Naeem Muro  MRN: [id number]  Admission Date: 3/13/2025  Hospital Length of Stay: 1 days  Code Status: Full Code   Attending Provider: Andrade Gauthier MD   Consulting Provider: OSVALDO Villar  Primary Care Physician: Claribel Rasmussen FNP-C  Principal Problem:<principal problem not specified>    Patient information was obtained from patient, past medical records, ER records, and primary team.     Subjective:   Chief Complaint/Reason for Consult: AF RVR/CP    HPI:   Mr. Muro is a 54 year old male, known to Dr. Hood (2021), who presented to the hospital with LUQ Abdominal pain and CP. Gradual onset of symptoms. En route patient became tachycardic with AF RVR in the 150's. Chest x-ray impression reviewed demonstrated patchy bibasilar airspace with small pleural effusion. CT of the chest abdomen and pelvis with IV contrast impression reviewed demonstrated several parapelvic renal cysts, prior partial gastrectomy, no bowel obstructions; no ascites free air or fluid collection, bilateral consolidation in the peripheral lower lobes, cirrhosis small pleural effusion. Patient did have a drop in his blood pressure in the ED which he responded to IV hydration. Patient was given IV hydration, received Cardizem bolus with initiation of Cardizem infusion for acute HR Control.   Troponin noted to be normal. CT Imaging revealed Bilateral consolidation in the peripheral lower lobes appears partially atelectasis, but pneumonia or aspiration also possible.Cirrhosis.  Small pleural effusions.    PMH: Hypertension, Hyperlipidemia, Hypothyroidism, Cirrhosis Liver, PTSD, DM II, Chronic Lower Back Pain, Arthritis, Peripheral Neuropathy  PSH: Endoscopy, Angiogram  Family History:  Mother- Cancer  Social History: Tobacco- Negative, Alcohol- Negative Substance Abuse- Negative    Previous Cardiac Diagnostics:   Telemetry Event Monitor (7 Hours 17 Minutes) (5.29.20):  This is a good quality study.   Symptom diary was submitted by the patient. No patient triggered events reported  Predominant rhythm is normal sinus rhythm.   The minimum heart rate recorded was 45 beats / minute (normal sinus rhythm). The maximum heart rate is 153 beats / minute (sinus tachycardia). The mean heart rate is 68 beats / minute. Tachycardia events () suspected to be sinus however atrial tachycardia vs other SVT unable to rule out due to rate / P-T wave fusion.  No evidence of AV block is noted.   Rare premature atrial contractions noted.   Rare premature ventricular contractions noted.    No evidence of ventricular tachycardia is noted.  No evidence of ventricular arrhythmia is noted.  No pauses were noted.     Echocardiogram (5.28.20):  EF 54%  Bubble Study Negative.    Carotid US (5.26.20):  No Significant Carotid Stenosis.    PET (12.27.19):  This is probably a normal perfusion study. There is no evidence of ischemia.   This scan is suggestive of low risk for future cardiovascular events.   Small fixed perfusion abnormality of mild intensity in the apical inferior segment which is more pronounced on rest images.   The left ventricular cavity is noted to be normal on the stress studies. The stress left ventricular ejection fraction was calculated to be 65% and left ventricular global function is normal. The rest left ventricular cavity is noted to be normal. The rest left ventricular ejection fraction was calculated to be 59% and rest left ventricular global function is normal.   When compared to the resting ejection fraction (59%), the stress ejection fraction (65%) has increased.   The study quality is average.     Echocardiogram (5.9.18):  The left ventricle is normal in size.  Global left ventricular  systolic function is normal. The left ventricular ejection fraction is 60%. Left ventricular diastolic function is normal. Asymmetric septal left ventricular hypertrophy is present. It is mild.   No significant valvular regurgitation could be appreciated on this study.    The pulmonary artery systolic pressure is 10 mmHg.     Review of patient's allergies indicates:   Allergen Reactions    Venom-wasp Swelling     No current facility-administered medications on file prior to encounter.     No current outpatient medications on file prior to encounter.     Review of Systems   Respiratory:  Negative for chest tightness and shortness of breath.    Cardiovascular:  Positive for palpitations and leg swelling. Negative for chest pain.   Neurological:  Positive for light-headedness.   All other systems reviewed and are negative.    Objective:     Vital Signs (Most Recent):  Temp: 98 °F (36.7 °C) (03/14/25 0418)  Pulse: 76 (03/14/25 0418)  Resp: 17 (03/13/25 2008)  BP: 109/71 (03/14/25 0418)  SpO2: 95 % (03/14/25 0418) Vital Signs (24h Range):  Temp:  [98 °F (36.7 °C)-98.1 °F (36.7 °C)] 98 °F (36.7 °C)  Pulse:  [] 76  Resp:  [17-26] 17  SpO2:  [94 %-99 %] 95 %  BP: ()/(58-94) 109/71   Weight: 120.2 kg (265 lb)  Body mass index is 33.12 kg/m².  SpO2: 95 %       Intake/Output Summary (Last 24 hours) at 3/14/2025 0755  Last data filed at 3/14/2025 0500  Gross per 24 hour   Intake 240 ml   Output --   Net 240 ml     Lines/Drains/Airways       Peripheral Intravenous Line  Duration                  Peripheral IV - Single Lumen 03/13/25 0940 20 G Left Forearm <1 day         Peripheral IV - Single Lumen 03/13/25 0940 20 G Right Forearm <1 day                  Significant Labs:   Chemistries:   Recent Labs   Lab 03/13/25  0944 03/13/25  1119 03/14/25  0427   NA  --  133* 139   K  --  3.8 3.9   CL  --  103 104   CO2  --  22 26   BUN  --  12.0 10.6   CREATININE  --  0.64* 0.62*   CALCIUM  --  7.7* 7.7*   BILITOT  --  1.7*  "1.6*   ALKPHOS  --  95 91   ALT  --  43 39   AST  --  84* 62*   GLUCOSE  --  115* 94   MG 1.90  --   --    TROPONINI <0.010  --   --         CBC/Anemia Labs: Coags:    Recent Labs   Lab 03/13/25  0940 03/13/25  0944 03/14/25  0427   WBC  --  10.02 7.66   HGB  --  14.1 14.4   HCT 42 43.0 41.8*   PLT  --  207 190   MCV  --  100.7* 96.1*   RDW  --  13.2 13.0    No results for input(s): "PT", "INR", "APTT" in the last 168 hours.     Significant Imaging:  Imaging Results              CT Chest Abdomen Pelvis With IV Contrast (XPD) Oral Contrast for GI Bypass (Final result)  Result time 03/13/25 11:59:44      Final result by Fox Lao MD (03/13/25 11:59:44)                   Impression:      Bilateral consolidation in the peripheral lower lobes appears partially atelectasis, but pneumonia or aspiration also possible.    Cirrhosis.  Small pleural effusions.      Electronically signed by: Fox Lao  Date:    03/13/2025  Time:    11:59               Narrative:    EXAMINATION:  CT CHEST ABDOMEN PELVIS WITH IV CONTRAST (XPD)    CLINICAL HISTORY:  Severe left upper quadrant abdominal pain but also having chest pain with hemoptysis;    TECHNIQUE:  CT imaging from the chest through the pelvis after IV contrast.  Axial, coronal and sagittal reformatted images reviewed. Dose length product 2365 mGycm. Automatic exposure control, adjustment of mA/kV or iterative reconstruction technique used to limit radiation dose.    COMPARISON:  CT abdomen/pelvis 08/01/2021    FINDINGS:  Lung and large airways: Areas of peripheral consolidation in both lower lobes.    Pleura: Small bilateral pleural effusions.    Mediastinum and lauren: Normal heart size. No pericardial effusion.  Minimally enlarged right paraesophageal lymph node measuring 12 mm short axis similar since 2021.    Chest wall/axilla and lower neck: No significant findings.    Liver/biliary: Cirrhosis.  No suspicious liver lesion identified on single phase exam.  No biliary " dilatation.    Pancreas: Normal.    Spleen: Normal.    Adrenals: Normal.    Genitourinary: Symmetric enhancement of the kidneys.  Several parapelvic renal cysts.  No significant ureteral dilatation.  Bladder within normal limits.    Stomach/Bowel: Prior partial gastrectomy.  No bowel obstruction.  No discernible sites of bowel inflammation.    Abdominal/pelvic lymph nodes and peritoneum: No pathologically enlarged lymph node identified. No ascites, free air or fluid collection.    Abdominal/pelvic vasculature: Normal caliber of the abdominal aorta.  Moderate caliber splenorenal shunt.    Abdominal wall: Small fat containing umbilical hernia.    Musculoskeletal: No acute osseous findings.                                       X-Ray Chest 1 View (Final result)  Result time 03/13/25 11:01:21      Final result by Neema Michael MD (03/13/25 11:01:21)                   Impression:      Patchy bibasilar airspace with small pleural effusions.      Electronically signed by: Neema Michael  Date:    03/13/2025  Time:    11:01               Narrative:    EXAMINATION:  XR CHEST 1 VIEW    CLINICAL HISTORY:  cough;    TECHNIQUE:  AP chest    COMPARISON:  Chest x-ray dated 08/01/2021    FINDINGS:  The heart is stable in size.  There are patchy bibasilar airspace opacities with small pleural effusions.  There is no visible pneumothorax.                                    EKG:       Telemetry:  SR    Physical Exam  Vitals and nursing note reviewed.   HENT:      Head: Normocephalic.      Mouth/Throat:      Mouth: Mucous membranes are moist.      Pharynx: Oropharynx is clear.   Cardiovascular:      Rate and Rhythm: Normal rate and regular rhythm.   Pulmonary:      Effort: Pulmonary effort is normal. No respiratory distress.   Abdominal:      Palpations: Abdomen is soft.   Musculoskeletal:      Left lower leg: Edema present.   Skin:     General: Skin is warm and dry.   Neurological:      General: No focal deficit present.       Mental Status: He is alert and oriented to person, place, and time. Mental status is at baseline.   Psychiatric:         Behavior: Behavior normal.       Home Medications:   Medications Ordered Prior to Encounter[1]  Current Schedule Inpatient Medications:   azithromycin  500 mg Intravenous Q24H    cefTRIAXone (Rocephin) IV (PEDS and ADULTS)  1 g Intravenous Q24H    enoxparin  40 mg Subcutaneous Daily    levalbuterol  0.63 mg Nebulization Q6H WAKE     Continuous Infusions:   dilTIAZem  5 mg/hr Intravenous Continuous 5 mL/hr at 03/13/25 1134 5 mg/hr at 03/13/25 1134     Assessment:   Atrial Fibrillation with RVR ([place])- Now SR    - [id number]: 2  Chest Pain (Resolved)    - Troponin Normal    - PET (12/19): No Ischemia  Pneumonia  Hypotension (Recovered with IV Hydration)    - History of Hypertension  Hyperlipidemia  DM II    - Hyperglycemia  Hyponatremia  Generalized Weakness/Chronic Arthritis (Osteo)  PTSD  Hypothyroidism  Peripheral Neuropathy  Chronic Lower Back Pain  VIVIAN (Resolved with Weight Loss)  Hepatic Cirrhosis   Anxiety  Depression  History of Obesity with Gastric Sleeve Placement     Plan:   Start Metoprolol Tartrate 25 Mg PO BID  Turn Cardizem Infusion off once Beta Blocker given  Start Eliquis 5 Mg PO BID  Get Echo  Lasix 40 Mg IV x 1 Dose this AM  Check Venous Duplex of BLE To rule out DVT  Check D DIMER  Consider outpatient Sleep Study on outpatient basis.     Thank you for your consult.     OSVALDO Villar  Cardiology  [facility]     I agree with the findings of the complexity of problems addressed and take responsibility for the plan's risks and complications. I approved the plan documented by Zonia Boss NP.            [1]   No current facility-administered medications on file prior to encounter.     No current outpatient medications on file prior to encounter.

## 2025-03-15 LAB
ANION GAP SERPL CALC-SCNC: 5 MEQ/L
APICAL FOUR CHAMBER EJECTION FRACTION: 66 %
APICAL TWO CHAMBER EJECTION FRACTION: 60 %
AV INDEX (PROSTH): 0.62
AV MEAN GRADIENT: 7 MMHG
AV PEAK GRADIENT: 12 MMHG
AV VALVE AREA BY VELOCITY RATIO: 2.6 CM²
AV VALVE AREA: 2.1 CM²
AV VELOCITY RATIO: 0.76
BSA FOR ECHO PROCEDURE: 2.52 M2
BUN SERPL-MCNC: 14.7 MG/DL (ref 8.4–25.7)
CALCIUM SERPL-MCNC: 7.9 MG/DL (ref 8.4–10.2)
CHLORIDE SERPL-SCNC: 105 MMOL/L (ref 98–107)
CO2 SERPL-SCNC: 28 MMOL/L (ref 22–29)
CREAT SERPL-MCNC: 0.67 MG/DL (ref 0.72–1.25)
CREAT/UREA NIT SERPL: 22
CV ECHO LV RWT: 0.39 CM
DOP CALC AO PEAK VEL: 1.7 M/S
DOP CALC AO VTI: 32.7 CM
DOP CALC LVOT AREA: 3.5 CM2
DOP CALC LVOT DIAMETER: 2.1 CM
DOP CALC LVOT PEAK VEL: 1.3 M/S
DOP CALC LVOT STROKE VOLUME: 70.3 CM3
DOP CALC MV VTI: 29.4 CM
DOP CALCLVOT PEAK VEL VTI: 20.3 CM
E WAVE DECELERATION TIME: 203 MSEC
E/A RATIO: 1.01
E/E' RATIO: 12 M/S
ECHO LV POSTERIOR WALL: 1 CM (ref 0.6–1.1)
ERYTHROCYTE [DISTWIDTH] IN BLOOD BY AUTOMATED COUNT: 13.2 % (ref 11.5–17)
FRACTIONAL SHORTENING: 35.3 % (ref 28–44)
GFR SERPLBLD CREATININE-BSD FMLA CKD-EPI: >60 ML/MIN/1.73/M2
GLUCOSE SERPL-MCNC: 90 MG/DL (ref 74–100)
HCT VFR BLD AUTO: 37.7 % (ref 42–52)
HGB BLD-MCNC: 13 G/DL (ref 14–18)
INTERVENTRICULAR SEPTUM: 1.1 CM (ref 0.6–1.1)
LEFT ATRIUM AREA SYSTOLIC (APICAL 2 CHAMBER): 23 CM2
LEFT ATRIUM AREA SYSTOLIC (APICAL 4 CHAMBER): 21.2 CM2
LEFT ATRIUM SIZE: 4.3 CM
LEFT ATRIUM VOLUME INDEX MOD: 29 ML/M2
LEFT ATRIUM VOLUME MOD: 72 ML
LEFT INTERNAL DIMENSION IN SYSTOLE: 3.3 CM (ref 2.1–4)
LEFT VENTRICLE DIASTOLIC VOLUME INDEX: 50.2 ML/M2
LEFT VENTRICLE DIASTOLIC VOLUME: 124 ML
LEFT VENTRICLE END DIASTOLIC VOLUME APICAL 2 CHAMBER: 175 ML
LEFT VENTRICLE END DIASTOLIC VOLUME APICAL 4 CHAMBER: 138 ML
LEFT VENTRICLE END SYSTOLIC VOLUME APICAL 2 CHAMBER: 81.2 ML
LEFT VENTRICLE END SYSTOLIC VOLUME APICAL 4 CHAMBER: 64.7 ML
LEFT VENTRICLE MASS INDEX: 81.3 G/M2
LEFT VENTRICLE SYSTOLIC VOLUME INDEX: 17.8 ML/M2
LEFT VENTRICLE SYSTOLIC VOLUME: 44 ML
LEFT VENTRICULAR INTERNAL DIMENSION IN DIASTOLE: 5.1 CM (ref 3.5–6)
LEFT VENTRICULAR MASS: 200.8 G
LV LATERAL E/E' RATIO: 11.4 M/S
LV SEPTAL E/E' RATIO: 12.5 M/S
LVED V (TEICH): 124 ML
LVES V (TEICH): 44.1 ML
LVOT MG: 3 MMHG
LVOT MV: 0.84 CM/S
MAGNESIUM SERPL-MCNC: 2.2 MG/DL (ref 1.6–2.6)
MCH RBC QN AUTO: 33.3 PG (ref 27–31)
MCHC RBC AUTO-ENTMCNC: 34.5 G/DL (ref 33–36)
MCV RBC AUTO: 96.7 FL (ref 80–94)
MV MEAN GRADIENT: 3 MMHG
MV PEAK A VEL: 1.24 M/S
MV PEAK E VEL: 1.25 M/S
MV PEAK GRADIENT: 5 MMHG
MV VALVE AREA BY CONTINUITY EQUATION: 2.39 CM2
NRBC BLD AUTO-RTO: 0 %
OHS CV RV/LV RATIO: 0.75 CM
OHS LV EJECTION FRACTION SIMPSONS BIPLANE MOD: 62 %
PLATELET # BLD AUTO: 213 X10(3)/MCL (ref 130–400)
PMV BLD AUTO: 9.6 FL (ref 7.4–10.4)
POCT GLUCOSE: 89 MG/DL (ref 70–110)
POCT GLUCOSE: 93 MG/DL (ref 70–110)
POCT GLUCOSE: 97 MG/DL (ref 70–110)
POTASSIUM SERPL-SCNC: 3.9 MMOL/L (ref 3.5–5.1)
RA MAJOR: 4.9 CM
RA PRESSURE ESTIMATED: 3 MMHG
RA WIDTH: 4.1 CM
RBC # BLD AUTO: 3.9 X10(6)/MCL (ref 4.7–6.1)
RIGHT VENTRICLE DIASTOLIC BASEL DIMENSION: 3.8 CM
RIGHT VENTRICLE DIASTOLIC MID DIMENSION: 3.3 CM
RIGHT VENTRICULAR END-DIASTOLIC DIMENSION: 3.8 CM
RV MID DIAMA: 3.3 CM
SODIUM SERPL-SCNC: 138 MMOL/L (ref 136–145)
TDI LATERAL: 0.11 M/S
TDI SEPTAL: 0.1 M/S
TDI: 0.11 M/S
TRICUSPID ANNULAR PLANE SYSTOLIC EXCURSION: 2.87 CM
WBC # BLD AUTO: 6.77 X10(3)/MCL (ref 4.5–11.5)
Z-SCORE OF LEFT VENTRICULAR DIMENSION IN END DIASTOLE: -9.28
Z-SCORE OF LEFT VENTRICULAR DIMENSION IN END SYSTOLE: -6.67

## 2025-03-15 PROCEDURE — 85027 COMPLETE CBC AUTOMATED: CPT | Performed by: INTERNAL MEDICINE

## 2025-03-15 PROCEDURE — 63600175 PHARM REV CODE 636 W HCPCS: Performed by: INTERNAL MEDICINE

## 2025-03-15 PROCEDURE — 80048 BASIC METABOLIC PNL TOTAL CA: CPT | Performed by: INTERNAL MEDICINE

## 2025-03-15 PROCEDURE — 63600175 PHARM REV CODE 636 W HCPCS: Performed by: NURSE PRACTITIONER

## 2025-03-15 PROCEDURE — 25000003 PHARM REV CODE 250: Performed by: INTERNAL MEDICINE

## 2025-03-15 PROCEDURE — 21400001 HC TELEMETRY ROOM

## 2025-03-15 PROCEDURE — 25000003 PHARM REV CODE 250: Performed by: NURSE PRACTITIONER

## 2025-03-15 PROCEDURE — 83735 ASSAY OF MAGNESIUM: CPT | Performed by: INTERNAL MEDICINE

## 2025-03-15 PROCEDURE — 36415 COLL VENOUS BLD VENIPUNCTURE: CPT | Performed by: INTERNAL MEDICINE

## 2025-03-15 RX ORDER — FUROSEMIDE 10 MG/ML
40 INJECTION INTRAMUSCULAR; INTRAVENOUS EVERY 12 HOURS
Status: DISCONTINUED | OUTPATIENT
Start: 2025-03-15 | End: 2025-03-17 | Stop reason: HOSPADM

## 2025-03-15 RX ADMIN — APIXABAN 10 MG: 5 TABLET, FILM COATED ORAL at 08:03

## 2025-03-15 RX ADMIN — AZITHROMYCIN MONOHYDRATE 500 MG: 500 INJECTION, POWDER, LYOPHILIZED, FOR SOLUTION INTRAVENOUS at 12:03

## 2025-03-15 RX ADMIN — FUROSEMIDE 40 MG: 10 INJECTION, SOLUTION INTRAVENOUS at 08:03

## 2025-03-15 RX ADMIN — METOPROLOL TARTRATE 25 MG: 25 TABLET, FILM COATED ORAL at 08:03

## 2025-03-15 RX ADMIN — FAMOTIDINE 20 MG: 20 TABLET, FILM COATED ORAL at 08:03

## 2025-03-15 RX ADMIN — CEFTRIAXONE SODIUM 1 G: 1 INJECTION, POWDER, FOR SOLUTION INTRAMUSCULAR; INTRAVENOUS at 12:03

## 2025-03-15 RX ADMIN — FUROSEMIDE 40 MG: 10 INJECTION, SOLUTION INTRAVENOUS at 04:03

## 2025-03-15 NOTE — PROGRESS NOTES
[facility]    Cardiology  Progress Note    Patient Name: Naeem Muro  MRN: [id number]  Admission Date: 3/13/2025  Hospital Length of Stay: 2 days  Code Status: Full Code   Attending Provider: Andrade Gauthier MD   Consulting Provider: OSVALDO Damon  Primary Care Physician: Claribel Rasmussen FNP-C  Principal Problem:<principal problem not specified>    Patient information was obtained from patient, past medical records, ER records, and primary team.     Subjective:   Chief Complaint/Reason for Consult: AF RVR/CP    HPI:   Mr. Muro is a 54 year old male, known to Dr. Hood (2021), who presented to the hospital with LUQ Abdominal pain and CP. Gradual onset of symptoms. En route patient became tachycardic with AF RVR in the 150's. Chest x-ray impression reviewed demonstrated patchy bibasilar airspace with small pleural effusion. CT of the chest abdomen and pelvis with IV contrast impression reviewed demonstrated several parapelvic renal cysts, prior partial gastrectomy, no bowel obstructions; no ascites free air or fluid collection, bilateral consolidation in the peripheral lower lobes, cirrhosis small pleural effusion. Patient did have a drop in his blood pressure in the ED which he responded to IV hydration. Patient was given IV hydration, received Cardizem bolus with initiation of Cardizem infusion for acute HR Control.   Troponin noted to be normal. CT Imaging revealed Bilateral consolidation in the peripheral lower lobes appears partially atelectasis, but pneumonia or aspiration also possible.Cirrhosis.  Small pleural effusions.    3.15.25: NAD. Sitting up in bed. Improvement in BLE swelling. Denies CP, SOB, or palps.     PMH: Hypertension, Hyperlipidemia, Hypothyroidism, Cirrhosis Liver, PTSD, DM II, Chronic Lower Back Pain, Arthritis, Peripheral Neuropathy  PSH: Endoscopy, Angiogram  Family History: Mother- Cancer  Social History: Tobacco- Negative, Alcohol-  Negative Substance Abuse- Negative    Previous Cardiac Diagnostics:   CTA Chest (3.14.25):  Suboptimal contrast bolus timing and nondiagnostic limited opacification of the bilateral distal lower lung lobe subsegmental pulmonary arterial branches.  Otherwise, no pulmonary thromboembolism identified.  Bilateral moderate to large volume pleural effusions.  Bilateral lower lung lobes consolidative appearance for which concomitant infection is the suggestion beside compressive atelectasis.    BLE NIVA (3.14.25):  There is a deep vein thrombosis in the left popliteal vein, peroneal veins.  All other vessels of the left lower extremity venous system are patent with no evidence of thrombosis.  Negative for deep and superficial vein thrombosis in the right lower extremity    TTE (3.14.25):  Left Ventricle: The left ventricle is normal in size. Normal wall thickness. There is concentric remodeling. There is normal systolic function with a visually estimated ejection fraction of 60 - 65%. There is normal diastolic function.  Right Ventricle: The right ventricle is normal in size. Wall thickness is normal. Right ventricle wall motion  is normal. Systolic function is normal.  IVC/SVC: Normal venous pressure at 3 mmHg.    Telemetry Event Monitor (7 Hours 17 Minutes) (5.29.20):  This is a good quality study.   Symptom diary was submitted by the patient. No patient triggered events reported  Predominant rhythm is normal sinus rhythm.   The minimum heart rate recorded was 45 beats / minute (normal sinus rhythm). The maximum heart rate is 153 beats / minute (sinus tachycardia). The mean heart rate is 68 beats / minute. Tachycardia events () suspected to be sinus however atrial tachycardia vs other SVT unable to rule out due to rate / P-T wave fusion.  No evidence of AV block is noted.   Rare premature atrial contractions noted.   Rare premature ventricular contractions noted.    No evidence of ventricular tachycardia is  noted.  No evidence of ventricular arrhythmia is noted.  No pauses were noted.     Echocardiogram (5.28.20):  EF 54%  Bubble Study Negative.    Carotid US (5.26.20):  No Significant Carotid Stenosis.    PET (12.27.19):  This is probably a normal perfusion study. There is no evidence of ischemia.   This scan is suggestive of low risk for future cardiovascular events.   Small fixed perfusion abnormality of mild intensity in the apical inferior segment which is more pronounced on rest images.   The left ventricular cavity is noted to be normal on the stress studies. The stress left ventricular ejection fraction was calculated to be 65% and left ventricular global function is normal. The rest left ventricular cavity is noted to be normal. The rest left ventricular ejection fraction was calculated to be 59% and rest left ventricular global function is normal.   When compared to the resting ejection fraction (59%), the stress ejection fraction (65%) has increased.   The study quality is average.     Echocardiogram (5.9.18):  The left ventricle is normal in size.  Global left ventricular systolic function is normal. The left ventricular ejection fraction is 60%. Left ventricular diastolic function is normal. Asymmetric septal left ventricular hypertrophy is present. It is mild.   No significant valvular regurgitation could be appreciated on this study.    The pulmonary artery systolic pressure is 10 mmHg.     Review of patient's allergies indicates:   Allergen Reactions    Venom-wasp Swelling     No current facility-administered medications on file prior to encounter.     No current outpatient medications on file prior to encounter.     Review of Systems   Respiratory:  Negative for chest tightness and shortness of breath.    Cardiovascular:  Positive for leg swelling. Negative for chest pain and palpitations.   Neurological:  Negative for light-headedness.   All other systems reviewed and are negative.    Objective:  "    Vital Signs (Most Recent):  Temp: 97.8 °F (36.6 °C) (03/15/25 1523)  Pulse: 63 (03/15/25 1523)  Resp: 18 (03/15/25 1523)  BP: 112/68 (03/15/25 1523)  SpO2: 96 % (03/15/25 1523) Vital Signs (24h Range):  Temp:  [97.7 °F (36.5 °C)-98.2 °F (36.8 °C)] 97.8 °F (36.6 °C)  Pulse:  [63-77] 63  Resp:  [16-18] 18  SpO2:  [94 %-97 %] 96 %  BP: (105-113)/(60-70) 112/68   Weight: 120.2 kg (265 lb)  Body mass index is 33.12 kg/m².  SpO2: 96 %       Intake/Output Summary (Last 24 hours) at 3/15/2025 1643  Last data filed at 3/15/2025 1400  Gross per 24 hour   Intake 1380 ml   Output 850 ml   Net 530 ml     Lines/Drains/Airways       Peripheral Intravenous Line  Duration                  Peripheral IV - Single Lumen 03/13/25 0940 20 G Left Forearm 2 days         Peripheral IV - Single Lumen 03/13/25 0940 20 G Right Forearm 2 days                  Significant Labs:   Chemistries:   Recent Labs   Lab 03/13/25  0944 03/13/25  1119 03/14/25  0427 03/15/25  0630   NA  --  133* 139 138   K  --  3.8 3.9 3.9   CL  --  103 104 105   CO2  --  22 26 28   BUN  --  12.0 10.6 14.7   CREATININE  --  0.64* 0.62* 0.67*   CALCIUM  --  7.7* 7.7* 7.9*   BILITOT  --  1.7* 1.6*  --    ALKPHOS  --  95 91  --    ALT  --  43 39  --    AST  --  84* 62*  --    GLUCOSE  --  115* 94 90   MG 1.90  --   --  2.20   TROPONINI <0.010  --   --   --         CBC/Anemia Labs: Coags:    Recent Labs   Lab 03/13/25  0944 03/14/25  0427 03/15/25  0630   WBC 10.02 7.66 6.77   HGB 14.1 14.4 13.0*   HCT 43.0 41.8* 37.7*    190 213   .7* 96.1* 96.7*   RDW 13.2 13.0 13.2    No results for input(s): "PT", "INR", "APTT" in the last 168 hours.     Significant Imaging:  Imaging Results              CT Chest Abdomen Pelvis With IV Contrast (XPD) Oral Contrast for GI Bypass (Final result)  Result time 03/13/25 11:59:44      Final result by Fox Lao MD (03/13/25 11:59:44)                   Impression:      Bilateral consolidation in the peripheral lower " lobes appears partially atelectasis, but pneumonia or aspiration also possible.    Cirrhosis.  Small pleural effusions.      Electronically signed by: Fox Shilo  Date:    03/13/2025  Time:    11:59               Narrative:    EXAMINATION:  CT CHEST ABDOMEN PELVIS WITH IV CONTRAST (XPD)    CLINICAL HISTORY:  Severe left upper quadrant abdominal pain but also having chest pain with hemoptysis;    TECHNIQUE:  CT imaging from the chest through the pelvis after IV contrast.  Axial, coronal and sagittal reformatted images reviewed. Dose length product 2365 mGycm. Automatic exposure control, adjustment of mA/kV or iterative reconstruction technique used to limit radiation dose.    COMPARISON:  CT abdomen/pelvis 08/01/2021    FINDINGS:  Lung and large airways: Areas of peripheral consolidation in both lower lobes.    Pleura: Small bilateral pleural effusions.    Mediastinum and lauren: Normal heart size. No pericardial effusion.  Minimally enlarged right paraesophageal lymph node measuring 12 mm short axis similar since 2021.    Chest wall/axilla and lower neck: No significant findings.    Liver/biliary: Cirrhosis.  No suspicious liver lesion identified on single phase exam.  No biliary dilatation.    Pancreas: Normal.    Spleen: Normal.    Adrenals: Normal.    Genitourinary: Symmetric enhancement of the kidneys.  Several parapelvic renal cysts.  No significant ureteral dilatation.  Bladder within normal limits.    Stomach/Bowel: Prior partial gastrectomy.  No bowel obstruction.  No discernible sites of bowel inflammation.    Abdominal/pelvic lymph nodes and peritoneum: No pathologically enlarged lymph node identified. No ascites, free air or fluid collection.    Abdominal/pelvic vasculature: Normal caliber of the abdominal aorta.  Moderate caliber splenorenal shunt.    Abdominal wall: Small fat containing umbilical hernia.    Musculoskeletal: No acute osseous findings.                                       X-Ray Chest 1  View (Final result)  Result time 03/13/25 11:01:21      Final result by Neema Michael MD (03/13/25 11:01:21)                   Impression:      Patchy bibasilar airspace with small pleural effusions.      Electronically signed by: Neema Michael  Date:    03/13/2025  Time:    11:01               Narrative:    EXAMINATION:  XR CHEST 1 VIEW    CLINICAL HISTORY:  cough;    TECHNIQUE:  AP chest    COMPARISON:  Chest x-ray dated 08/01/2021    FINDINGS:  The heart is stable in size.  There are patchy bibasilar airspace opacities with small pleural effusions.  There is no visible pneumothorax.                                      Telemetry:  SR    Physical Exam  Vitals and nursing note reviewed.   HENT:      Head: Normocephalic.      Mouth/Throat:      Mouth: Mucous membranes are moist.      Pharynx: Oropharynx is clear.   Cardiovascular:      Rate and Rhythm: Normal rate and regular rhythm.      Pulses: Normal pulses.   Pulmonary:      Effort: Pulmonary effort is normal. No respiratory distress.      Comments: Diminished breath sounds bilaterally   Abdominal:      Palpations: Abdomen is soft.   Musculoskeletal:      Left lower leg: Edema present.   Skin:     General: Skin is warm and dry.   Neurological:      General: No focal deficit present.      Mental Status: He is alert and oriented to person, place, and time. Mental status is at baseline.   Psychiatric:         Behavior: Behavior normal.       Home Medications:   Medications Ordered Prior to Encounter[1]  Current Schedule Inpatient Medications:   apixaban  10 mg Oral BID    azithromycin  500 mg Intravenous Q24H    cefTRIAXone (Rocephin) IV (PEDS and ADULTS)  1 g Intravenous Q24H    famotidine  20 mg Oral BID    furosemide (LASIX) injection  40 mg Intravenous Q12H    metoprolol tartrate  25 mg Oral BID     Continuous Infusions:      Assessment:   Atrial Fibrillation with RVR (New Onset)- Now SR    - [id number]: 2  Chest Pain (Resolved)    - Troponin Normal     - PET (12/19): No Ischemia  Acute DVT    - BLE NIVA (3.14.25): deep vein thrombosis in the left popliteal vein, peroneal veins  Bilateral moderate to large volume pleural effusions  Pneumonia  Hypotension (Recovered with IV Hydration)    - History of Hypertension  Hyperlipidemia  DM II    - Hyperglycemia  Hyponatremia  Generalized Weakness/Chronic Arthritis (Osteo)  PTSD  Hypothyroidism  Peripheral Neuropathy  Chronic Lower Back Pain  VIVIAN (Resolved with Weight Loss)  Hepatic Cirrhosis   Anxiety  Depression  History of Obesity with Gastric Sleeve Placement     Plan:   *Case discussed with Dr. Maldonado   Continue Metoprolol Tartrate 25 Mg PO BID  DVT noted. Increase to Eliquis 10 mg BID x 7 days, then resume Eliquis 5 mg BID. No plans for mechanical thrombectomy.   Echo reviewed.  Start Lasix 40 mg IVP BID.  Ensure accurate I&O's and daily weights.  Unable to perform thoracentesis due to Eliquis use.   Consider outpatient Sleep Study on outpatient basis.       OSVALDO Damon  Cardiology  [facility]     I agree with the findings of the complexity of problems addressed and take responsibility for the plan's risks and complications. I approved the plan documented by Yanet Hunt NP.                    [1]   No current facility-administered medications on file prior to encounter.     No current outpatient medications on file prior to encounter.

## 2025-03-15 NOTE — PROGRESS NOTES
[facility] Hospital Medicine Progress Note        Chief Complaint: Inpatient Follow-up for AFib RVR    HPI per admitting team: Naeem Muro is a 54 y.o. male who  PMH includes anxiety, depression, cirrhosis of the liver, PTSD, DM type 2, HLD hypothyroidism, peripheral neuropathy; chronic arthritis with chronic lower back pain; presents to the ED at [facility] on 3/13/2025 with a primary complaint of LUQ abdominal pain and chest pain which onset gradually 1 week ago.  Patient reports his symptoms progressed over the past couple of days prompting him to call EMS for ED transport.  Per EMS, patient was sinus rhythm in 80-90's upon arrival. Pain worsens with deep breaths and movement. En route, pt became tachycardic 130-160's HR with Afib on EKG. Pt also reports episodes of coughing up blood tinged sputum.  He also reports shortness a breath, chills, subjective fever with associated lower extremity swelling.  Reports of nausea, vomiting, diarrhea.  Lab work reviewed demonstrated  sodium 133, glucose 115, AST 84; other indices unremarkable.  Chest x-ray impression reviewed demonstrated patchy bibasilar airspace with small pleural effusion.  CT of the chest abdomen and pelvis with IV contrast impression reviewed demonstrated several parapelvic renal cysts, prior partial gastrectomy, no bowel obstructions; no ascites free air or fluid collection, bilateral consolidation in the peripheral lower lobes, cirrhosis small pleural effusion.  Initial vital signs /72 pulse 161 respirations 24 temperature 98.1° F O2 saturation 99% on room air.  Patient did have a drop in his blood pressure in the ED which he responded to IV hydration.  Patient was given IV hydration, received Cardizem bolus with initiation of Cardizem infusion.  Patient is started on IV ceftriaxone and azithromycin therapy.  Patient heart rate had improved at the time of examination.  Patient is admitted to hospital medicine  services for further management.  Cardiology Services have been consulted    Interval Hx:   Patient is sitting in bed, noted leg swelling is much better improved than yesterday.  Right lower extremity edema has almost resolved, left lower edema still 1+.  Discussed finding of his ultrasound and blood clot in the left lower extremity.  Discuss his CTA chest PE protocol did not show any blood clot in the lungs but does shows pleural fluid   Noted he is off of Cardizem drip  I answered all his questions to the best of my knowledge and their satisfaction  Family is at bedside  Case was discussed with patient's nurse as well as Yanet NP with Cardiology, we discussed he will probably benefit from IV Lasix given he can not undergo thoracentesis as Eliquis is on board    Objective/physical exam:  General: In no acute distress, afebrile, walks with a little limb due to arthritis in right knee  Chest:  Decreased breath sounds bilateral bases, on room air  Heart: RRR, +S1, S2, no appreciable murmur  Abdomen: Soft, nontender, BS +  MSK: Warm, 2+ pitting edema bilateral lower extremities   Neurologic: Alert and oriented x4, Cranial nerve II-XII intact, Strength 5/5 in all 4 extremities    VITAL SIGNS: 24 HRS MIN & MAX LAST   Temp  Min: 97.7 °F (36.5 °C)  Max: 98.2 °F (36.8 °C) 97.7 °F (36.5 °C)   BP  Min: 105/66  Max: 113/60 107/70   Pulse  Min: 63  Max: 84  68   Resp  Min: 16  Max: 18 16   SpO2  Min: 94 %  Max: 97 % 97 %     I reviewed the labs below:  Recent Labs   Lab 03/13/25  0944 03/14/25  0427 03/15/25  0630   WBC 10.02 7.66 6.77   RBC 4.27* 4.35* 3.90*   HGB 14.1 14.4 13.0*   HCT 43.0 41.8* 37.7*   .7* 96.1* 96.7*   MCH 33.0* 33.1* 33.3*   MCHC 32.8* 34.4 34.5   RDW 13.2 13.0 13.2    190 213   MPV 9.5 9.4 9.6     Recent Labs   Lab 03/13/25  0944 03/13/25  1119 03/14/25  0427 03/15/25  0630   NA  --  133* 139 138   K  --  3.8 3.9 3.9   CL  --  103 104 105   CO2  --  22 26 28   BUN  --  12.0 10.6 14.7    CREATININE  --  0.64* 0.62* 0.67*   CALCIUM  --  7.7* 7.7* 7.9*   MG 1.90  --   --  2.20   ALBUMIN  --  2.2* 2.2*  --    ALKPHOS  --  95 91  --    ALT  --  43 39  --    AST  --  84* 62*  --    BILITOT  --  1.7* 1.6*  --      Microbiology Results (last 7 days)       Procedure Component Value Units Date/Time    Blood culture #1 **CANNOT BE ORDERED STAT** [[id number]]  (Normal) Collected: 03/13/25 0942    Order Status: Completed Specimen: Blood Updated: 03/15/25 1101     Blood Culture No Growth At 48 Hours    Blood culture #2 **CANNOT BE ORDERED STAT** [[id number]]  (Normal) Collected: 03/13/25 0942    Order Status: Completed Specimen: Blood Updated: 03/15/25 1101     Blood Culture No Growth At 48 Hours           See below for Radiology    Intake and Output:  Intake/Output Summary (Last 24 hours) at 3/15/2025 1431  Last data filed at 3/15/2025 0617  Gross per 24 hour   Intake 480 ml   Output 850 ml   Net -370 ml       Assessment/Plan:  New left lower extremity DVT  Atrial fibrillation with RVR-new onset-POA - now in sinus rhythm  Dyspnea on mild exertion- POA --> Moderate-to-large volume bilateral pleural effusion- worsened  Bilateral consolidation in lower lobes-differentials include CAP vs atelectasis vs aspiration - POA  Hypovolemic hypotension- POA- resolved with hydration  Hyponatremia- POA- resolved   Dm type 2 with hyperglycemia- POA  Hx of  anxiety, depression, cirrhosis of the liver, PTSD, HTN, HLD hypothyroidism, peripheral neuropathy; chronic arthritis with chronic lower back pain, morbid obesity status post gastric sleeve, now BMI 33.1, history of obstructive sleep apnea resolved with weight loss       Cardiology on board, appreciate recommendation.  Continue metoprolol 25 mg p.o. b.i.d..  Also started on Eliquis 10 mg b.i.d. for 7 days and then 5 mg b.i.d. there onward- day 2 of 7  From the history I obtained from the patient, it seems that he maybe in and out of AFib for awhile, probably paroxysmal  AFib  Venous ultrasound bilateral lower extremities- DVT in the left popliteal vein, peroneal vein.  Negative for DVT in the right  Elevate legs  CTA chest PE protocol-suboptimal study though no pulmonary embolism identified.  Moderate to large volume pleural effusion, bilateral lower lung lobe consolidative changes for which concomitant infection is the suggestion bedside compressive atelectasis  Patient can not undergo thoracentesis given he has been on Eliquis for 2 days now  Case was personally discussed with Cardiology NP Yanet, plan for IV Lasix b.i.d. and see if we can improve his hypervolemia  Empirically started on IV ceftriaxone and azithromycin- day 3 of 5  No leukocytosis with WBC 7 K, afebrile  Blood cultures x 2- negative at 48 hours  Resp PCR - negative  Xopenex treatments q.6 hours PRN, avoid DuoNeb given AFib RVR  Incentive spirometry q.2 hours while awake   Insulin sliding scale, Accu-Cheks AC and HS  A1c 5.0  Cardiac diabetic diet  Encourage patient to have sleep study as outpatient to rule out obstructive sleep apnea  No home meds  PT OT evaluated the patient, patient does not need therapy, he has been independent  Morning CBC, BMP, Mag ordered        VTE Prophylaxis:  Lovenox    Patient condition:  Fair    Anticipated discharge and Disposition:   TBD    Critical care note:  Critical care diagnosis:  Acute on chronic systolic heart failure with bilateral pleural effusion requiring frequent IV Lasix pushes  Critical care interventions: Hands-on evaluation, review of labs/radiographs/records and discussion with patient and family if present  Critical care time spent: 35 minutes      All diagnosis and differential diagnosis have been reviewed; assessment and plan has been documented; I have personally reviewed the labs and test results that are presently available; I have reviewed the patients medication list; I have reviewed the consulting providers response and recommendations. I have reviewed  or attempted to review medical records based upon their availability    All of the patient's questions have been  addressed and answered. Patient's is agreeable to the above stated plan. I will continue to monitor closely and make adjustments to medical management as needed.    Portions of this note dictated using EMR integrated voice recognition software, and may be subject to voice recognition errors not corrected at proofreading. Please contact writer for clarification if needed.   _____________________________________________________________________    Nutrition Status:  Nutrition consulted. Most recent weight and BMI monitored-     Measurements:  Wt Readings from Last 1 Encounters:   03/13/25 120.2 kg (265 lb)   Body mass index is 33.12 kg/m².    Patient has been screened and assessed by RD.    Malnutrition Type:  Context:    Level:      Malnutrition Characteristic Summary:       Interventions/Recommendations (treatment strategy):         A minimum of two characteristics is recommended for diagnosis of either severe or non-severe malnutrition.     Current Med:   apixaban  10 mg Oral BID    azithromycin  500 mg Intravenous Q24H    cefTRIAXone (Rocephin) IV (PEDS and ADULTS)  1 g Intravenous Q24H    famotidine  20 mg Oral BID    furosemide (LASIX) injection  40 mg Intravenous Q12H    metoprolol tartrate  25 mg Oral BID      PRN meds:  Current Facility-Administered Medications:     acetaminophen, 1,000 mg, Oral, Q6H PRN    acetaminophen, 650 mg, Oral, Q4H PRN    dextrose 50%, 12.5 g, Intravenous, PRN    dextrose 50%, 25 g, Intravenous, PRN    glucagon (human recombinant), 1 mg, Intramuscular, PRN    glucose, 16 g, Oral, PRN    glucose, 24 g, Oral, PRN    insulin aspart U-100, 0-5 Units, Subcutaneous, QID (AC + HS) PRN    levalbuterol, 0.63 mg, Nebulization, Q6H PRN    metoprolol, 5 mg, Intravenous, Q4H PRN    naloxone, 0.02 mg, Intravenous, PRN    sodium chloride 0.9%, 10 mL, Intravenous, PRN    Continuous  infusion:         Radiology:   I have personally reviewed the images and agree with radiologist report  Echo Saline Bubble? Yes    Left Ventricle: The left ventricle is normal in size. Normal wall   thickness. There is concentric remodeling. There is normal systolic   function with a visually estimated ejection fraction of 60 - 65%. There is   normal diastolic function.    Right Ventricle: The right ventricle is normal in size. Wall thickness   is normal. Right ventricle wall motion  is normal. Systolic function is   normal.    IVC/SVC: Normal venous pressure at 3 mmHg.        Nash Peraza MD  Department of Hospital Medicine   [facility]   03/15/2025

## 2025-03-16 LAB
ANION GAP SERPL CALC-SCNC: 7 MEQ/L
BUN SERPL-MCNC: 13.5 MG/DL (ref 8.4–25.7)
CALCIUM SERPL-MCNC: 7.8 MG/DL (ref 8.4–10.2)
CHLORIDE SERPL-SCNC: 105 MMOL/L (ref 98–107)
CO2 SERPL-SCNC: 26 MMOL/L (ref 22–29)
CREAT SERPL-MCNC: 0.66 MG/DL (ref 0.72–1.25)
CREAT/UREA NIT SERPL: 20
ERYTHROCYTE [DISTWIDTH] IN BLOOD BY AUTOMATED COUNT: 13.1 % (ref 11.5–17)
GFR SERPLBLD CREATININE-BSD FMLA CKD-EPI: >60 ML/MIN/1.73/M2
GLUCOSE SERPL-MCNC: 84 MG/DL (ref 74–100)
HCT VFR BLD AUTO: 38.3 % (ref 42–52)
HGB BLD-MCNC: 13.5 G/DL (ref 14–18)
MAGNESIUM SERPL-MCNC: 1.9 MG/DL (ref 1.6–2.6)
MCH RBC QN AUTO: 33.5 PG (ref 27–31)
MCHC RBC AUTO-ENTMCNC: 35.2 G/DL (ref 33–36)
MCV RBC AUTO: 95 FL (ref 80–94)
NRBC BLD AUTO-RTO: 0 %
PLATELET # BLD AUTO: 236 X10(3)/MCL (ref 130–400)
PMV BLD AUTO: 9.6 FL (ref 7.4–10.4)
POCT GLUCOSE: 104 MG/DL (ref 70–110)
POTASSIUM SERPL-SCNC: 4 MMOL/L (ref 3.5–5.1)
RBC # BLD AUTO: 4.03 X10(6)/MCL (ref 4.7–6.1)
SODIUM SERPL-SCNC: 138 MMOL/L (ref 136–145)
WBC # BLD AUTO: 5.72 X10(3)/MCL (ref 4.5–11.5)

## 2025-03-16 PROCEDURE — 25000003 PHARM REV CODE 250: Performed by: INTERNAL MEDICINE

## 2025-03-16 PROCEDURE — 80048 BASIC METABOLIC PNL TOTAL CA: CPT | Performed by: INTERNAL MEDICINE

## 2025-03-16 PROCEDURE — 63600175 PHARM REV CODE 636 W HCPCS: Performed by: INTERNAL MEDICINE

## 2025-03-16 PROCEDURE — 36415 COLL VENOUS BLD VENIPUNCTURE: CPT | Performed by: INTERNAL MEDICINE

## 2025-03-16 PROCEDURE — 83735 ASSAY OF MAGNESIUM: CPT | Performed by: INTERNAL MEDICINE

## 2025-03-16 PROCEDURE — 85027 COMPLETE CBC AUTOMATED: CPT | Performed by: INTERNAL MEDICINE

## 2025-03-16 PROCEDURE — 63600175 PHARM REV CODE 636 W HCPCS: Performed by: NURSE PRACTITIONER

## 2025-03-16 PROCEDURE — 21400001 HC TELEMETRY ROOM

## 2025-03-16 PROCEDURE — 25000003 PHARM REV CODE 250: Performed by: NURSE PRACTITIONER

## 2025-03-16 RX ORDER — ALUMINUM HYDROXIDE, MAGNESIUM HYDROXIDE, AND SIMETHICONE 1200; 120; 1200 MG/30ML; MG/30ML; MG/30ML
30 SUSPENSION ORAL EVERY 4 HOURS PRN
Status: DISCONTINUED | OUTPATIENT
Start: 2025-03-16 | End: 2025-03-17 | Stop reason: HOSPADM

## 2025-03-16 RX ADMIN — METOPROLOL TARTRATE 25 MG: 25 TABLET, FILM COATED ORAL at 08:03

## 2025-03-16 RX ADMIN — FUROSEMIDE 40 MG: 10 INJECTION, SOLUTION INTRAVENOUS at 08:03

## 2025-03-16 RX ADMIN — APIXABAN 10 MG: 5 TABLET, FILM COATED ORAL at 08:03

## 2025-03-16 RX ADMIN — AZITHROMYCIN MONOHYDRATE 500 MG: 500 INJECTION, POWDER, LYOPHILIZED, FOR SOLUTION INTRAVENOUS at 12:03

## 2025-03-16 RX ADMIN — FAMOTIDINE 20 MG: 20 TABLET, FILM COATED ORAL at 08:03

## 2025-03-16 RX ADMIN — CEFTRIAXONE SODIUM 1 G: 1 INJECTION, POWDER, FOR SOLUTION INTRAMUSCULAR; INTRAVENOUS at 12:03

## 2025-03-16 RX ADMIN — ALUMINUM HYDROXIDE, MAGNESIUM HYDROXIDE, AND DIMETHICONE 30 ML: 200; 20; 200 SUSPENSION ORAL at 05:03

## 2025-03-16 NOTE — PROGRESS NOTES
[facility] Hospital Medicine Progress Note        Chief Complaint: Inpatient Follow-up for AFib RVR    HPI per admitting team: Naeem Muro is a 54 y.o. male who  PMH includes anxiety, depression, cirrhosis of the liver, PTSD, DM type 2, HLD hypothyroidism, peripheral neuropathy; chronic arthritis with chronic lower back pain; presents to the ED at [facility] on 3/13/2025 with a primary complaint of LUQ abdominal pain and chest pain which onset gradually 1 week ago.  Patient reports his symptoms progressed over the past couple of days prompting him to call EMS for ED transport.  Per EMS, patient was sinus rhythm in 80-90's upon arrival. Pain worsens with deep breaths and movement. En route, pt became tachycardic 130-160's HR with Afib on EKG. Pt also reports episodes of coughing up blood tinged sputum.  He also reports shortness a breath, chills, subjective fever with associated lower extremity swelling.  Reports of nausea, vomiting, diarrhea.  Lab work reviewed demonstrated  sodium 133, glucose 115, AST 84; other indices unremarkable.  Chest x-ray impression reviewed demonstrated patchy bibasilar airspace with small pleural effusion.  CT of the chest abdomen and pelvis with IV contrast impression reviewed demonstrated several parapelvic renal cysts, prior partial gastrectomy, no bowel obstructions; no ascites free air or fluid collection, bilateral consolidation in the peripheral lower lobes, cirrhosis small pleural effusion.  Initial vital signs /72 pulse 161 respirations 24 temperature 98.1° F O2 saturation 99% on room air.  Patient did have a drop in his blood pressure in the ED which he responded to IV hydration.  Patient was given IV hydration, received Cardizem bolus with initiation of Cardizem infusion.  Patient is started on IV ceftriaxone and azithromycin therapy.  Patient heart rate had improved at the time of examination.  Patient is admitted to hospital medicine  services for further management.  Cardiology Services have been consulted    Interval Hx:   Patient is sitting in bed, legs propped up on a wedge, swelling markedly improved in bilateral legs, still have 1+ in the left lower extremity but right lower extremity edema completely resolved   Discussed we started him on Lasix given he has fluids in his lungs, reported he was unable to lie down flat last night as he felt he could not breathe.    Family is at bedside  Case was discussed with patient's nurse on the floor    Objective/physical exam:  General: In no acute distress, afebrile, walks with a little limb due to arthritis in right knee  Chest:  Decreased breath sounds bilateral bases, on room air  Heart: RRR, +S1, S2, no appreciable murmur  Abdomen: Soft, nontender, BS +  MSK: Warm, 2+ pitting edema bilateral lower extremities   Neurologic: Alert and oriented x4, Cranial nerve II-XII intact, Strength 5/5 in all 4 extremities    VITAL SIGNS: 24 HRS MIN & MAX LAST   Temp  Min: 97.6 °F (36.4 °C)  Max: 98.1 °F (36.7 °C) 98.1 °F (36.7 °C)   BP  Min: 107/71  Max: 129/86 107/71   Pulse  Min: 59  Max: 86  (!) 59   Resp  Min: 18  Max: 20 18   SpO2  Min: 92 %  Max: 96 % 96 %     I reviewed the labs below:  Recent Labs   Lab 03/14/25  0427 03/15/25  0630 03/16/25  0430   WBC 7.66 6.77 5.72   RBC 4.35* 3.90* 4.03*   HGB 14.4 13.0* 13.5*   HCT 41.8* 37.7* 38.3*   MCV 96.1* 96.7* 95.0*   MCH 33.1* 33.3* 33.5*   MCHC 34.4 34.5 35.2   RDW 13.0 13.2 13.1    213 236   MPV 9.4 9.6 9.6     Recent Labs   Lab 03/13/25  0944 03/13/25  1119 03/13/25  1119 03/14/25  0427 03/15/25  0630 03/16/25  0430   NA  --  133*   < > 139 138 138   K  --  3.8   < > 3.9 3.9 4.0   CL  --  103   < > 104 105 105   CO2  --  22   < > 26 28 26   BUN  --  12.0   < > 10.6 14.7 13.5   CREATININE  --  0.64*   < > 0.62* 0.67* 0.66*   CALCIUM  --  7.7*   < > 7.7* 7.9* 7.8*   MG 1.90  --   --   --  2.20 1.90   ALBUMIN  --  2.2*  --  2.2*  --   --    ALKPHOS   --  95  --  91  --   --    ALT  --  43  --  39  --   --    AST  --  84*  --  62*  --   --    BILITOT  --  1.7*  --  1.6*  --   --     < > = values in this interval not displayed.     Microbiology Results (last 7 days)       Procedure Component Value Units Date/Time    Blood culture #1 **CANNOT BE ORDERED STAT** [[id number]]  (Normal) Collected: 03/13/25 0942    Order Status: Completed Specimen: Blood Updated: 03/16/25 1101     Blood Culture No Growth At 72 Hours    Blood culture #2 **CANNOT BE ORDERED STAT** [[id number]]  (Normal) Collected: 03/13/25 0942    Order Status: Completed Specimen: Blood Updated: 03/16/25 1101     Blood Culture No Growth At 72 Hours           See below for Radiology    Intake and Output:  Intake/Output Summary (Last 24 hours) at 3/16/2025 1459  Last data filed at 3/15/2025 2100  Gross per 24 hour   Intake 240 ml   Output 600 ml   Net -360 ml       Assessment/Plan:  New left lower extremity DVT  Atrial fibrillation with RVR-new onset-POA - now in sinus rhythm  Dyspnea on mild exertion- POA --> Moderate-to-large volume bilateral pleural effusion- worsened, now with orthopnea   Bilateral consolidation in lower lobes-differentials include CAP vs atelectasis vs aspiration - POA  Hypovolemic hypotension- POA- resolved with hydration  Hyponatremia- POA- resolved   Dm type 2 - diet controlled- POA- A1c 5.0, well controlled  Hx of  anxiety, depression, cirrhosis of the liver, PTSD, HTN, HLD hypothyroidism, peripheral neuropathy; chronic arthritis with chronic lower back pain, morbid obesity status post gastric sleeve, now BMI 33.1, history of obstructive sleep apnea resolved with weight loss       Cardiology on board, appreciate recommendation.  Continue metoprolol 25 mg p.o. b.i.d..  Also started on Eliquis 10 mg b.i.d. for 7 days and then 5 mg b.i.d. there onward- day 2 of 7 given new diagnosis of left lower extremity DVT  From the history I obtained from the patient, it seems that he maybe in  and out of AFib for awhile, probably paroxysmal AFib  Venous ultrasound bilateral lower extremities- DVT in the left popliteal vein, peroneal vein.  Negative for DVT in the right- no indication for mechanical thrombectomy  Elevate legs, swelling markedly improved  CTA chest PE protocol-suboptimal study though no pulmonary embolism identified.  Moderate to large volume pleural effusion, bilateral lower lung lobe consolidative changes for which concomitant infection is the suggestion bedside compressive atelectasis  Patient can not undergo thoracentesis given he has been on Eliquis  Continue IV Lasix 40 mg b.i.d. patient reported good urinary output  Repeat x-ray chest AP and lateral in a.m  tomorrow to reassess  Empirically started on IV ceftriaxone and azithromycin- day 4 of 5  No leukocytosis with WBC 7 K, afebrile  Blood cultures x 2- negative at 72 hours  Resp PCR - negative  Xopenex treatments q.6 hours PRN, avoid DuoNeb given AFib RVR  Incentive spirometry q.2 hours while awake   Insulin sliding scale, Accu-Cheks AC and HS  A1c 5.0  Cardiac diabetic diet  Encourage patient to have sleep study as outpatient to rule out obstructive sleep apnea  No home meds  PT OT evaluated the patient, patient does not need therapy, he has been independent  Morning lab stable, repeat Tuesday        VTE Prophylaxis:  Eliquis    Patient condition:  Fair    Anticipated discharge and Disposition:   TBD    Critical care note:  Critical care diagnosis:  Acute on chronic systolic heart failure with bilateral pleural effusion with orthopnea requiring frequent IV Lasix pushes  Critical care interventions: Hands-on evaluation, review of labs/radiographs/records and discussion with patient and family if present  Critical care time spent: 35 minutes      All diagnosis and differential diagnosis have been reviewed; assessment and plan has been documented; I have personally reviewed the labs and test results that are presently available; I  have reviewed the patients medication list; I have reviewed the consulting providers response and recommendations. I have reviewed or attempted to review medical records based upon their availability    All of the patient's questions have been  addressed and answered. Patient's is agreeable to the above stated plan. I will continue to monitor closely and make adjustments to medical management as needed.    Portions of this note dictated using EMR integrated voice recognition software, and may be subject to voice recognition errors not corrected at proofreading. Please contact writer for clarification if needed.   _____________________________________________________________________    Nutrition Status:  Nutrition consulted. Most recent weight and BMI monitored-     Measurements:  Wt Readings from Last 1 Encounters:   03/13/25 120.2 kg (265 lb)   Body mass index is 33.12 kg/m².    Patient has been screened and assessed by RD.    Malnutrition Type:  Context:    Level:      Malnutrition Characteristic Summary:       Interventions/Recommendations (treatment strategy):         A minimum of two characteristics is recommended for diagnosis of either severe or non-severe malnutrition.     Current Med:   apixaban  10 mg Oral BID    azithromycin  500 mg Intravenous Q24H    cefTRIAXone (Rocephin) IV (PEDS and ADULTS)  1 g Intravenous Q24H    famotidine  20 mg Oral BID    furosemide (LASIX) injection  40 mg Intravenous Q12H    metoprolol tartrate  25 mg Oral BID      PRN meds:  Current Facility-Administered Medications:     acetaminophen, 1,000 mg, Oral, Q6H PRN    acetaminophen, 650 mg, Oral, Q4H PRN    dextrose 50%, 12.5 g, Intravenous, PRN    dextrose 50%, 25 g, Intravenous, PRN    glucagon (human recombinant), 1 mg, Intramuscular, PRN    glucose, 16 g, Oral, PRN    glucose, 24 g, Oral, PRN    insulin aspart U-100, 0-5 Units, Subcutaneous, QID (AC + HS) PRN    levalbuterol, 0.63 mg, Nebulization, Q6H PRN    metoprolol, 5 mg,  Intravenous, Q4H PRN    naloxone, 0.02 mg, Intravenous, PRN    sodium chloride 0.9%, 10 mL, Intravenous, PRN    Continuous infusion:         Radiology:   I have personally reviewed the images and agree with radiologist report  Echo Saline Bubble? Yes    Left Ventricle: The left ventricle is normal in size. Normal wall   thickness. There is concentric remodeling. There is normal systolic   function with a visually estimated ejection fraction of 60 - 65%. There is   normal diastolic function.    Right Ventricle: The right ventricle is normal in size. Wall thickness   is normal. Right ventricle wall motion  is normal. Systolic function is   normal.    IVC/SVC: Normal venous pressure at 3 mmHg.        Nash Peraza MD  Department of Hospital Medicine   [facility]   03/16/2025

## 2025-03-16 NOTE — PROGRESS NOTES
[facility]    Cardiology  Progress Note    Patient Name: Naeem Muro  MRN: [id number]  Admission Date: 3/13/2025  Hospital Length of Stay: 3 days  Code Status: Full Code   Attending Provider: Andrade Gauthier MD   Consulting Provider: OSVALDO Damon  Primary Care Physician: Claribel Rasmussen FNP-C  Principal Problem:<principal problem not specified>    Patient information was obtained from patient, past medical records, ER records, and primary team.     Subjective:   Chief Complaint/Reason for Consult: AF RVR/CP    HPI:   Mr. Muro is a 54 year old male, known to Dr. Hood (2021), who presented to the hospital with LUQ Abdominal pain and CP. Gradual onset of symptoms. En route patient became tachycardic with AF RVR in the 150's. Chest x-ray impression reviewed demonstrated patchy bibasilar airspace with small pleural effusion. CT of the chest abdomen and pelvis with IV contrast impression reviewed demonstrated several parapelvic renal cysts, prior partial gastrectomy, no bowel obstructions; no ascites free air or fluid collection, bilateral consolidation in the peripheral lower lobes, cirrhosis small pleural effusion. Patient did have a drop in his blood pressure in the ED which he responded to IV hydration. Patient was given IV hydration, received Cardizem bolus with initiation of Cardizem infusion for acute HR Control.   Troponin noted to be normal. CT Imaging revealed Bilateral consolidation in the peripheral lower lobes appears partially atelectasis, but pneumonia or aspiration also possible.Cirrhosis.  Small pleural effusions.    3.15.25: NAD. Sitting up in bed. Improvement in BLE swelling. Denies CP, SOB, or palps.   3.16.25: NAD. Walking around in room. BLE swelling much improved. Left remains swollen but better. Reports slight improvement in SOB. Reports good urine output - not accurately documented. Denies CP.     PMH: Hypertension, Hyperlipidemia,  Hypothyroidism, Cirrhosis Liver, PTSD, DM II, Chronic Lower Back Pain, Arthritis, Peripheral Neuropathy  PSH: Endoscopy, Angiogram  Family History: Mother- Cancer  Social History: Tobacco- Negative, Alcohol- Negative Substance Abuse- Negative    Previous Cardiac Diagnostics:   CTA Chest (3.14.25):  Suboptimal contrast bolus timing and nondiagnostic limited opacification of the bilateral distal lower lung lobe subsegmental pulmonary arterial branches.  Otherwise, no pulmonary thromboembolism identified.  Bilateral moderate to large volume pleural effusions.  Bilateral lower lung lobes consolidative appearance for which concomitant infection is the suggestion beside compressive atelectasis.    BLE NIVA (3.14.25):  There is a deep vein thrombosis in the left popliteal vein, peroneal veins.  All other vessels of the left lower extremity venous system are patent with no evidence of thrombosis.  Negative for deep and superficial vein thrombosis in the right lower extremity    TTE (3.14.25):  Left Ventricle: The left ventricle is normal in size. Normal wall thickness. There is concentric remodeling. There is normal systolic function with a visually estimated ejection fraction of 60 - 65%. There is normal diastolic function.  Right Ventricle: The right ventricle is normal in size. Wall thickness is normal. Right ventricle wall motion  is normal. Systolic function is normal.  IVC/SVC: Normal venous pressure at 3 mmHg.    Telemetry Event Monitor (7 Hours 17 Minutes) (5.29.20):  This is a good quality study.   Symptom diary was submitted by the patient. No patient triggered events reported  Predominant rhythm is normal sinus rhythm.   The minimum heart rate recorded was 45 beats / minute (normal sinus rhythm). The maximum heart rate is 153 beats / minute (sinus tachycardia). The mean heart rate is 68 beats / minute. Tachycardia events () suspected to be sinus however atrial tachycardia vs other SVT unable to rule out due  to rate / P-T wave fusion.  No evidence of AV block is noted.   Rare premature atrial contractions noted.   Rare premature ventricular contractions noted.    No evidence of ventricular tachycardia is noted.  No evidence of ventricular arrhythmia is noted.  No pauses were noted.     Echocardiogram (5.28.20):  EF 54%  Bubble Study Negative.    Carotid US (5.26.20):  No Significant Carotid Stenosis.    PET (12.27.19):  This is probably a normal perfusion study. There is no evidence of ischemia.   This scan is suggestive of low risk for future cardiovascular events.   Small fixed perfusion abnormality of mild intensity in the apical inferior segment which is more pronounced on rest images.   The left ventricular cavity is noted to be normal on the stress studies. The stress left ventricular ejection fraction was calculated to be 65% and left ventricular global function is normal. The rest left ventricular cavity is noted to be normal. The rest left ventricular ejection fraction was calculated to be 59% and rest left ventricular global function is normal.   When compared to the resting ejection fraction (59%), the stress ejection fraction (65%) has increased.   The study quality is average.     Echocardiogram (5.9.18):  The left ventricle is normal in size.  Global left ventricular systolic function is normal. The left ventricular ejection fraction is 60%. Left ventricular diastolic function is normal. Asymmetric septal left ventricular hypertrophy is present. It is mild.   No significant valvular regurgitation could be appreciated on this study.    The pulmonary artery systolic pressure is 10 mmHg.     Review of patient's allergies indicates:   Allergen Reactions    Venom-wasp Swelling     No current facility-administered medications on file prior to encounter.     No current outpatient medications on file prior to encounter.     Review of Systems   Respiratory:  Positive for shortness of breath. Negative for chest  tightness.    Cardiovascular:  Positive for leg swelling. Negative for chest pain and palpitations.   Neurological:  Negative for light-headedness.   All other systems reviewed and are negative.    Objective:     Vital Signs (Most Recent):  Temp: 98.3 °F (36.8 °C) (03/16/25 1529)  Pulse: 64 (03/16/25 1529)  Resp: 18 (03/16/25 1114)  BP: 119/77 (03/16/25 1529)  SpO2: 95 % (03/16/25 1529) Vital Signs (24h Range):  Temp:  [97.6 °F (36.4 °C)-98.3 °F (36.8 °C)] 98.3 °F (36.8 °C)  Pulse:  [59-86] 64  Resp:  [18-20] 18  SpO2:  [92 %-96 %] 95 %  BP: (107-129)/(71-86) 119/77   Weight: 120.2 kg (265 lb)  Body mass index is 33.12 kg/m².  SpO2: 95 %       Intake/Output Summary (Last 24 hours) at 3/16/2025 1750  Last data filed at 3/16/2025 1631  Gross per 24 hour   Intake 480 ml   Output 1150 ml   Net -670 ml     Lines/Drains/Airways       Peripheral Intravenous Line  Duration                  Peripheral IV - Single Lumen 03/13/25 0940 20 G Left Forearm 3 days         Peripheral IV - Single Lumen 03/13/25 0940 20 G Right Forearm 3 days                  Significant Labs:   Chemistries:   Recent Labs   Lab 03/13/25  0944 03/13/25  1119 03/13/25  1119 03/14/25  0427 03/15/25  0630 03/16/25  0430   NA  --  133*  --  139 138 138   K  --  3.8  --  3.9 3.9 4.0   CL  --  103   < > 104 105 105   CO2  --  22   < > 26 28 26   BUN  --  12.0   < > 10.6 14.7 13.5   CREATININE  --  0.64*   < > 0.62* 0.67* 0.66*   CALCIUM  --  7.7*   < > 7.7* 7.9* 7.8*   BILITOT  --  1.7*  --  1.6*  --   --    ALKPHOS  --  95  --  91  --   --    ALT  --  43  --  39  --   --    AST  --  84*  --  62*  --   --    GLUCOSE  --  115*   < > 94 90 84   MG 1.90  --   --   --  2.20 1.90   TROPONINI <0.010  --   --   --   --   --     < > = values in this interval not displayed.        CBC/Anemia Labs: Coags:    Recent Labs   Lab 03/14/25  0427 03/15/25  0630 03/16/25  0430   WBC 7.66 6.77 5.72   HGB 14.4 13.0* 13.5*   HCT 41.8* 37.7* 38.3*    213 236   MCV 96.1*  "96.7* 95.0*   RDW 13.0 13.2 13.1    No results for input(s): "PT", "INR", "APTT" in the last 168 hours.     Significant Imaging:  Imaging Results              CT Chest Abdomen Pelvis With IV Contrast (XPD) Oral Contrast for GI Bypass (Final result)  Result time 03/13/25 11:59:44      Final result by Fox Lao MD (03/13/25 11:59:44)                   Impression:      Bilateral consolidation in the peripheral lower lobes appears partially atelectasis, but pneumonia or aspiration also possible.    Cirrhosis.  Small pleural effusions.      Electronically signed by: Fox Lao  Date:    03/13/2025  Time:    11:59               Narrative:    EXAMINATION:  CT CHEST ABDOMEN PELVIS WITH IV CONTRAST (XPD)    CLINICAL HISTORY:  Severe left upper quadrant abdominal pain but also having chest pain with hemoptysis;    TECHNIQUE:  CT imaging from the chest through the pelvis after IV contrast.  Axial, coronal and sagittal reformatted images reviewed. Dose length product 2365 mGycm. Automatic exposure control, adjustment of mA/kV or iterative reconstruction technique used to limit radiation dose.    COMPARISON:  CT abdomen/pelvis 08/01/2021    FINDINGS:  Lung and large airways: Areas of peripheral consolidation in both lower lobes.    Pleura: Small bilateral pleural effusions.    Mediastinum and lauren: Normal heart size. No pericardial effusion.  Minimally enlarged right paraesophageal lymph node measuring 12 mm short axis similar since 2021.    Chest wall/axilla and lower neck: No significant findings.    Liver/biliary: Cirrhosis.  No suspicious liver lesion identified on single phase exam.  No biliary dilatation.    Pancreas: Normal.    Spleen: Normal.    Adrenals: Normal.    Genitourinary: Symmetric enhancement of the kidneys.  Several parapelvic renal cysts.  No significant ureteral dilatation.  Bladder within normal limits.    Stomach/Bowel: Prior partial gastrectomy.  No bowel obstruction.  No discernible sites of " bowel inflammation.    Abdominal/pelvic lymph nodes and peritoneum: No pathologically enlarged lymph node identified. No ascites, free air or fluid collection.    Abdominal/pelvic vasculature: Normal caliber of the abdominal aorta.  Moderate caliber splenorenal shunt.    Abdominal wall: Small fat containing umbilical hernia.    Musculoskeletal: No acute osseous findings.                                       X-Ray Chest 1 View (Final result)  Result time 03/13/25 11:01:21      Final result by Neema Michael MD (03/13/25 11:01:21)                   Impression:      Patchy bibasilar airspace with small pleural effusions.      Electronically signed by: Neema Michael  Date:    03/13/2025  Time:    11:01               Narrative:    EXAMINATION:  XR CHEST 1 VIEW    CLINICAL HISTORY:  cough;    TECHNIQUE:  AP chest    COMPARISON:  Chest x-ray dated 08/01/2021    FINDINGS:  The heart is stable in size.  There are patchy bibasilar airspace opacities with small pleural effusions.  There is no visible pneumothorax.                                      Telemetry:  SR    Physical Exam  Vitals and nursing note reviewed.   HENT:      Head: Normocephalic.      Mouth/Throat:      Mouth: Mucous membranes are moist.      Pharynx: Oropharynx is clear.   Eyes:      Extraocular Movements: Extraocular movements intact.   Cardiovascular:      Rate and Rhythm: Normal rate and regular rhythm.      Pulses: Normal pulses.   Pulmonary:      Effort: Pulmonary effort is normal. No respiratory distress.      Comments: Diminished breath sounds bilaterally   Abdominal:      Palpations: Abdomen is soft.   Musculoskeletal:      Right lower leg: No edema (resolved).      Left lower leg: Edema present.   Skin:     General: Skin is warm and dry.   Neurological:      General: No focal deficit present.      Mental Status: He is alert and oriented to person, place, and time. Mental status is at baseline.   Psychiatric:         Behavior: Behavior  normal.       Home Medications:   Medications Ordered Prior to Encounter[1]  Current Schedule Inpatient Medications:   apixaban  10 mg Oral BID    azithromycin  500 mg Intravenous Q24H    cefTRIAXone (Rocephin) IV (PEDS and ADULTS)  1 g Intravenous Q24H    famotidine  20 mg Oral BID    furosemide (LASIX) injection  40 mg Intravenous Q12H    metoprolol tartrate  25 mg Oral BID     Continuous Infusions:      Assessment:   Atrial Fibrillation with RVR (New Onset)- Now SR    - [id number]: 2  Chest Pain (Resolved)    - Troponin Normal    - PET (12/19): No Ischemia  Acute DVT    - BLE NIVA (3.14.25): deep vein thrombosis in the left popliteal vein, peroneal veins  Bilateral moderate to large volume pleural effusions  Pneumonia  Hypotension (Recovered with IV Hydration)    - History of Hypertension  Hyperlipidemia  DM II    - Hyperglycemia  Hyponatremia  Generalized Weakness/Chronic Arthritis (Osteo)  PTSD  Hypothyroidism  Peripheral Neuropathy  Chronic Lower Back Pain  VIVIAN (Resolved with Weight Loss)  Hepatic Cirrhosis   Anxiety  Depression  History of Obesity with Gastric Sleeve Placement     Plan:   Continue Metoprolol Tartrate 25 Mg PO BID  Eliquis 10 mg BID x 7 days, then resume Eliquis 5 mg BID for DVT/PAF.   Continue Lasix 40 mg IVP BID.  Ensure accurate I&O's and daily weights.  Unable to perform thoracentesis due to Eliquis use.   Consider outpatient Sleep Study on outpatient basis.   Keep K > 4 & Mg > 2.     OSVALDO Damon  Cardiology  [facility]                 [1]   No current facility-administered medications on file prior to encounter.     No current outpatient medications on file prior to encounter.

## 2025-03-17 VITALS
TEMPERATURE: 98 F | DIASTOLIC BLOOD PRESSURE: 59 MMHG | RESPIRATION RATE: 18 BRPM | HEART RATE: 62 BPM | SYSTOLIC BLOOD PRESSURE: 104 MMHG | OXYGEN SATURATION: 97 % | WEIGHT: 288.38 LBS | HEIGHT: 75 IN | BODY MASS INDEX: 35.86 KG/M2

## 2025-03-17 PROBLEM — J90 BILATERAL PLEURAL EFFUSION: Status: RESOLVED | Noted: 2025-03-17 | Resolved: 2025-03-17

## 2025-03-17 PROBLEM — I48.91 NEW ONSET A-FIB: Status: ACTIVE | Noted: 2025-03-17

## 2025-03-17 PROBLEM — E11.9 TYPE 2 DIABETES, DIET CONTROLLED: Status: ACTIVE | Noted: 2025-03-17

## 2025-03-17 PROBLEM — J90 BILATERAL PLEURAL EFFUSION: Status: ACTIVE | Noted: 2025-03-17

## 2025-03-17 PROBLEM — J18.9 PNEUMONIA DUE TO INFECTIOUS ORGANISM: Status: RESOLVED | Noted: 2025-03-17 | Resolved: 2025-03-17

## 2025-03-17 PROBLEM — J18.9 PNEUMONIA DUE TO INFECTIOUS ORGANISM: Status: ACTIVE | Noted: 2025-03-17

## 2025-03-17 PROBLEM — I82.402 LEG DVT (DEEP VENOUS THROMBOEMBOLISM), ACUTE, LEFT: Status: ACTIVE | Noted: 2025-03-17

## 2025-03-17 LAB
ANION GAP SERPL CALC-SCNC: 6 MEQ/L
BUN SERPL-MCNC: 14.7 MG/DL (ref 8.4–25.7)
CALCIUM SERPL-MCNC: 8.1 MG/DL (ref 8.4–10.2)
CHLORIDE SERPL-SCNC: 101 MMOL/L (ref 98–107)
CO2 SERPL-SCNC: 29 MMOL/L (ref 22–29)
CREAT SERPL-MCNC: 0.74 MG/DL (ref 0.72–1.25)
CREAT/UREA NIT SERPL: 20
GFR SERPLBLD CREATININE-BSD FMLA CKD-EPI: >60 ML/MIN/1.73/M2
GLUCOSE SERPL-MCNC: 84 MG/DL (ref 74–100)
MAGNESIUM SERPL-MCNC: 2 MG/DL (ref 1.6–2.6)
OHS QRS DURATION: 104 MS
OHS QTC CALCULATION: 449 MS
POTASSIUM SERPL-SCNC: 4.6 MMOL/L (ref 3.5–5.1)
SODIUM SERPL-SCNC: 136 MMOL/L (ref 136–145)

## 2025-03-17 PROCEDURE — 63600175 PHARM REV CODE 636 W HCPCS: Performed by: INTERNAL MEDICINE

## 2025-03-17 PROCEDURE — 80048 BASIC METABOLIC PNL TOTAL CA: CPT

## 2025-03-17 PROCEDURE — 83735 ASSAY OF MAGNESIUM: CPT

## 2025-03-17 PROCEDURE — 25000003 PHARM REV CODE 250: Performed by: INTERNAL MEDICINE

## 2025-03-17 PROCEDURE — 36415 COLL VENOUS BLD VENIPUNCTURE: CPT

## 2025-03-17 PROCEDURE — 63600175 PHARM REV CODE 636 W HCPCS: Performed by: NURSE PRACTITIONER

## 2025-03-17 PROCEDURE — 93010 ELECTROCARDIOGRAM REPORT: CPT | Mod: ,,, | Performed by: INTERNAL MEDICINE

## 2025-03-17 PROCEDURE — 25000003 PHARM REV CODE 250: Performed by: NURSE PRACTITIONER

## 2025-03-17 PROCEDURE — 93005 ELECTROCARDIOGRAM TRACING: CPT

## 2025-03-17 RX ORDER — FAMOTIDINE 20 MG/1
20 TABLET, FILM COATED ORAL 2 TIMES DAILY
Qty: 60 TABLET | Refills: 1 | Status: SHIPPED | OUTPATIENT
Start: 2025-03-17

## 2025-03-17 RX ORDER — METOPROLOL TARTRATE 25 MG/1
12.5 TABLET, FILM COATED ORAL 2 TIMES DAILY
Qty: 60 TABLET | Refills: 1 | Status: SHIPPED | OUTPATIENT
Start: 2025-03-17

## 2025-03-17 RX ORDER — FUROSEMIDE 40 MG/1
40 TABLET ORAL 2 TIMES DAILY
Qty: 60 TABLET | Refills: 1 | Status: SHIPPED | OUTPATIENT
Start: 2025-03-17

## 2025-03-17 RX ADMIN — AZITHROMYCIN MONOHYDRATE 500 MG: 500 INJECTION, POWDER, LYOPHILIZED, FOR SOLUTION INTRAVENOUS at 12:03

## 2025-03-17 RX ADMIN — FUROSEMIDE 40 MG: 10 INJECTION, SOLUTION INTRAVENOUS at 09:03

## 2025-03-17 RX ADMIN — FAMOTIDINE 20 MG: 20 TABLET, FILM COATED ORAL at 09:03

## 2025-03-17 RX ADMIN — CEFTRIAXONE SODIUM 1 G: 1 INJECTION, POWDER, FOR SOLUTION INTRAMUSCULAR; INTRAVENOUS at 12:03

## 2025-03-17 RX ADMIN — METOPROLOL TARTRATE 25 MG: 25 TABLET, FILM COATED ORAL at 09:03

## 2025-03-17 RX ADMIN — APIXABAN 10 MG: 5 TABLET, FILM COATED ORAL at 09:03

## 2025-03-17 NOTE — PLAN OF CARE
Problem: Adult Inpatient Plan of Care  Goal: Plan of Care Review  Outcome: Adequate for Care Transition  Goal: Patient-Specific Goal (Individualized)  Outcome: Adequate for Care Transition  Goal: Absence of Hospital-Acquired Illness or Injury  Outcome: Adequate for Care Transition  Goal: Optimal Comfort and Wellbeing  Outcome: Adequate for Care Transition  Goal: Readiness for Transition of Care  Outcome: Adequate for Care Transition     Problem: Fall Injury Risk  Goal: Absence of Fall and Fall-Related Injury  Outcome: Adequate for Care Transition     Problem: Diabetes Comorbidity  Goal: Blood Glucose Level Within Targeted Range  Outcome: Adequate for Care Transition

## 2025-03-17 NOTE — DISCHARGE SUMMARY
[facility] Hospital Medicine Discharge Summary    Admit Date: 3/13/2025  Discharge Date and Time: 3/17/271244:50 AM  Admitting Physician:  Team  Discharging Physician: Nash Peraza MD.  Primary Care Physician: Claribel Rasmussen FNP-C  Consults: Cardiology    Discharge Diagnoses:  New left lower extremity DVT  Atrial fibrillation with RVR-new onset-POA - now in sinus rhythm  Dyspnea on mild exertion- POA --> Moderate-to-large volume bilateral pleural effusion- worsened, now with orthopnea - resolved   Bilateral consolidation in lower lobes- CAP- POA- treated   Hypovolemic hypotension- POA- resolved with hydration  Hyponatremia- POA- resolved   Dm type 2 - diet controlled- POA- A1c 5.0, well controlled  Hx of  anxiety, depression, cirrhosis of the liver, PTSD, HTN, HLD hypothyroidism, peripheral neuropathy; chronic arthritis with chronic lower back pain, morbid obesity status post gastric sleeve, now BMI 33.1, history of obstructive sleep apnea resolved with weight loss    Hospital Course:   Naeem Muro is a 54 y.o. male who  PMH includes anxiety, depression, cirrhosis of the liver, PTSD, DM type 2, HLD hypothyroidism, peripheral neuropathy; chronic arthritis with chronic lower back pain; presents to the ED at [facility] on 3/13/2025 with a primary complaint of LUQ abdominal pain and chest pain which onset gradually 1 week ago.  Patient reports his symptoms progressed over the past couple of days prompting him to call EMS for ED transport.  Per EMS, patient was sinus rhythm in 80-90's upon arrival. Pain worsens with deep breaths and movement. En route, pt became tachycardic 130-160's HR with Afib on EKG. Pt also reports episodes of coughing up blood tinged sputum.  He also reports shortness a breath, chills, subjective fever with associated lower extremity swelling.  Reports of nausea, vomiting, diarrhea.  Lab work reviewed demonstrated  sodium 133, glucose 115, AST 84; other indices  unremarkable.  Chest x-ray impression reviewed demonstrated patchy bibasilar airspace with small pleural effusion.  CT of the chest abdomen and pelvis with IV contrast impression reviewed demonstrated several parapelvic renal cysts, prior partial gastrectomy, no bowel obstructions; no ascites free air or fluid collection, bilateral consolidation in the peripheral lower lobes, cirrhosis small pleural effusion.  Initial vital signs /72 pulse 161 respirations 24 temperature 98.1° F O2 saturation 99% on room air.  Patient did have a drop in his blood pressure in the ED which he responded to IV hydration.  Patient was given IV hydration, received Cardizem bolus with initiation of Cardizem infusion.  Patient is started on IV ceftriaxone and azithromycin therapy.  Patient heart rate had improved at the time of examination.  Patient is admitted to hospital medicine services for further management.  Cardiology Services have been consulted  Cardiology on board, appreciate recommendation.  Continue metoprolol 25 mg p.o. b.i.d..  Also started on Eliquis 10 mg b.i.d. for 7 days and then 5 mg b.i.d. there onward- day 2 of 7 given new diagnosis of left lower extremity DVT  From the history I obtained from the patient, it seems that he maybe in and out of AFib for awhile, probably paroxysmal AFib  Venous ultrasound bilateral lower extremities- DVT in the left popliteal vein, peroneal vein.  Negative for DVT in the right- no indication for mechanical thrombectomy  Elevate legs, swelling markedly improved  CTA chest PE protocol-suboptimal study though no pulmonary embolism identified.  Moderate to large volume pleural effusion, bilateral lower lung lobe consolidative changes for which concomitant infection is the suggestion bedside compressive atelectasis  Patient can not undergo thoracentesis given he has been on Eliquis  Continue IV Lasix 40 mg b.i.d. patient reported good urinary output  Repeat x-ray chest AP and lateral -->  effusion much improved  Empirically started on IV ceftriaxone and azithromycin- day 5 of 5  No leukocytosis with WBC 7 K, afebrile  Blood cultures x 2- negative at 96 hours  Resp PCR - negative  Incentive spirometry q.2 hours while awake   A1c 5.0  Cardiac diabetic diet  Encourage patient to have sleep study as outpatient to rule out obstructive sleep apnea  PT OT evaluated the patient, patient does not need therapy, he has been independent  Patient has been medically cleared per Cardiology to go home.  To follow up with [facility] in 2 weeks    Pt was seen and examined on the day of discharge  Vitals:  VITAL SIGNS: 24 HRS MIN & MAX LAST   Temp  Min: 97.8 °F (36.6 °C)  Max: 98.5 °F (36.9 °C) 97.9 °F (36.6 °C)   BP  Min: 104/59  Max: 120/79 (!) 104/59   Pulse  Min: 58  Max: 68  62   Resp  Min: 18  Max: 18 18   SpO2  Min: 95 %  Max: 97 % 97 %       Physical Exam:  General: In no acute distress, afebrile, walks with a little limb due to arthritis in right knee  Chest:  Decreased breath sounds bilateral bases, on room air  Heart: RRR, +S1, S2, no appreciable murmur  Abdomen: Soft, nontender, BS +  MSK: Warm, 2+ pitting edema bilateral lower extremities   Neurologic: Alert and oriented x4, Cranial nerve II-XII intact, Strength 5/5 in all 4 extremities    Recent Labs   Lab 03/14/25  0427 03/15/25  0630 03/16/25  0430   WBC 7.66 6.77 5.72   RBC 4.35* 3.90* 4.03*   HGB 14.4 13.0* 13.5*   HCT 41.8* 37.7* 38.3*   MCV 96.1* 96.7* 95.0*   MCH 33.1* 33.3* 33.5*   MCHC 34.4 34.5 35.2   RDW 13.0 13.2 13.1    213 236   MPV 9.4 9.6 9.6       Recent Labs   Lab 03/13/25  1119 03/14/25  0427 03/15/25  0630 03/16/25  0430 03/17/25  0418   * 139 138 138 136   K 3.8 3.9 3.9 4.0 4.6    104 105 105 101   CO2 22 26 28 26 29   BUN 12.0 10.6 14.7 13.5 14.7   CREATININE 0.64* 0.62* 0.67* 0.66* 0.74   CALCIUM 7.7* 7.7* 7.9* 7.8* 8.1*   MG  --   --  2.20 1.90 2.00   ALBUMIN 2.2* 2.2*  --   --   --    ALKPHOS 95 91  --   --   --    ALT  43 39  --   --   --    AST 84* 62*  --   --   --    BILITOT 1.7* 1.6*  --   --   --         Microbiology Results (last 7 days)       Procedure Component Value Units Date/Time    Blood culture #1 **CANNOT BE ORDERED STAT** [[id number]]  (Normal) Collected: 03/13/25 0942    Order Status: Completed Specimen: Blood Updated: 03/17/25 1100     Blood Culture No Growth At 96 Hours    Blood culture #2 **CANNOT BE ORDERED STAT** [[id number]]  (Normal) Collected: 03/13/25 0942    Order Status: Completed Specimen: Blood Updated: 03/17/25 1100     Blood Culture No Growth At 96 Hours             Echo Saline Bubble? Yes    Left Ventricle: The left ventricle is normal in size. Normal wall   thickness. There is concentric remodeling. There is normal systolic   function with a visually estimated ejection fraction of 60 - 65%. There is   normal diastolic function.    Right Ventricle: The right ventricle is normal in size. Wall thickness   is normal. Right ventricle wall motion  is normal. Systolic function is   normal.    IVC/SVC: Normal venous pressure at 3 mmHg.         Medication List        START taking these medications      apixaban 5 mg Tab  Commonly known as: ELIQUIS  Take 2 tablets (10 mg total) by mouth 2 (two) times daily for 4 days, THEN 1 tablet (5 mg total) 2 (two) times daily.  Start taking on: March 17, 2025     famotidine 20 MG tablet  Commonly known as: PEPCID  Take 1 tablet (20 mg total) by mouth 2 (two) times daily.     furosemide 40 MG tablet  Commonly known as: LASIX  Take 1 tablet (40 mg total) by mouth 2 (two) times daily.     metoprolol tartrate 25 MG tablet  Commonly known as: LOPRESSOR  Take 0.5 tablets (12.5 mg total) by mouth 2 (two) times daily.               Where to Get Your Medications        These medications were sent to [id number] at [facility] - [place] - [address]  [address] [id number]      Phone: [phone]   apixaban 5 mg  Tab  famotidine 20 MG tablet  furosemide 40 MG tablet  metoprolol tartrate 25 MG tablet          Explained in detail to the patient about the discharge plan, medications, and follow-up visits. Pt understands and agrees with the treatment plan  Discharge Disposition:  Home   Discharged Condition: stable  Diet-   Dietary Orders (From admission, onward)       Start     Ordered    03/13/25 1425  Diet Heart Healthy Diabetic; 2000 Calories (up to 75 gm per meal); Standard Tray  (Diet/Nutrition - [facility])  Diet effective now        Question Answer Comment   Diet Modifier: Diabetic    Total calories / carbs: 2000 Calories (up to 75 gm per meal)    Tray type: Standard Tray        03/13/25 1425                   Medications Per DC med rec  Activities as tolerated   Follow-up Information       Claribel Rasmussen FNP-C Follow up in 2 week(s).    Specialty: Family Medicine  Contact information:  [address] [place] [id number]  [phone]               David Cruz MD. Schedule an appointment as soon as possible for a visit in 2 week(s).    Specialty: Cardiology  Why: post discharge  Contact information:  [address] [place] [id number] [phone]                           For further questions contact hospitalist office    Discharge time 34 minutes    For worsening symptoms, chest pain, shortness of breath, increased abdominal pain, high grade fever, stroke or stroke like symptoms, immediately go to the nearest Emergency Room or call 911 as soon as possible.    Portions of this note dictated using EMR integrated voice recognition software, and may be subject to voice recognition errors not corrected at proofreading. Please contact writer for clarification if needed    Nash Peraza MD  Department of Hospital Medicine   [facility]   03/17/2025 11:50 AM

## 2025-03-17 NOTE — PROGRESS NOTES
[facility]    Cardiology  Progress Note    Patient Name: Naeem Muro  MRN: [id number]  Admission Date: 3/13/2025  Hospital Length of Stay: 4 days  Code Status: Full Code   Attending Provider: No att. providers found   Consulting Provider: OSVALDO Damon  Primary Care Physician: Claribel Rasmussen FNP-C  Principal Problem:New onset a-fib    Patient information was obtained from patient, past medical records, ER records, and primary team.     Subjective:   Chief Complaint/Reason for Consult: AF RVR/CP    HPI:   Mr. Muro is a 54 year old male, known to Dr. Hood (2021), who presented to the hospital with LUQ Abdominal pain and CP. Gradual onset of symptoms. En route patient became tachycardic with AF RVR in the 150's. Chest x-ray impression reviewed demonstrated patchy bibasilar airspace with small pleural effusion. CT of the chest abdomen and pelvis with IV contrast impression reviewed demonstrated several parapelvic renal cysts, prior partial gastrectomy, no bowel obstructions; no ascites free air or fluid collection, bilateral consolidation in the peripheral lower lobes, cirrhosis small pleural effusion. Patient did have a drop in his blood pressure in the ED which he responded to IV hydration. Patient was given IV hydration, received Cardizem bolus with initiation of Cardizem infusion for acute HR Control.   Troponin noted to be normal. CT Imaging revealed Bilateral consolidation in the peripheral lower lobes appears partially atelectasis, but pneumonia or aspiration also possible.Cirrhosis.  Small pleural effusions.    3.15.25: NAD. Sitting up in bed. Improvement in BLE swelling. Denies CP, SOB, or palps.   3.16.25: NAD. Walking around in room. BLE swelling much improved. Left remains swollen but better. Reports slight improvement in SOB. Reports good urine output - not accurately documented. Denies CP.   3.17.25: NAD. SB on tele. Denies Cp, SOB, or palps. CXR  with improved aeration. BP stable on RA.     PMH: Hypertension, Hyperlipidemia, Hypothyroidism, Cirrhosis Liver, PTSD, DM II, Chronic Lower Back Pain, Arthritis, Peripheral Neuropathy  PSH: Endoscopy, Angiogram  Family History: Mother- Cancer  Social History: Tobacco- Negative, Alcohol- Negative Substance Abuse- Negative    Previous Cardiac Diagnostics:   CTA Chest (3.14.25):  Suboptimal contrast bolus timing and nondiagnostic limited opacification of the bilateral distal lower lung lobe subsegmental pulmonary arterial branches.  Otherwise, no pulmonary thromboembolism identified.  Bilateral moderate to large volume pleural effusions.  Bilateral lower lung lobes consolidative appearance for which concomitant infection is the suggestion beside compressive atelectasis.    BLE NIVA (3.14.25):  There is a deep vein thrombosis in the left popliteal vein, peroneal veins.  All other vessels of the left lower extremity venous system are patent with no evidence of thrombosis.  Negative for deep and superficial vein thrombosis in the right lower extremity    TTE (3.14.25):  Left Ventricle: The left ventricle is normal in size. Normal wall thickness. There is concentric remodeling. There is normal systolic function with a visually estimated ejection fraction of 60 - 65%. There is normal diastolic function.  Right Ventricle: The right ventricle is normal in size. Wall thickness is normal. Right ventricle wall motion  is normal. Systolic function is normal.  IVC/SVC: Normal venous pressure at 3 mmHg.    Telemetry Event Monitor (7 Hours 17 Minutes) (5.29.20):  This is a good quality study.   Symptom diary was submitted by the patient. No patient triggered events reported  Predominant rhythm is normal sinus rhythm.   The minimum heart rate recorded was 45 beats / minute (normal sinus rhythm). The maximum heart rate is 153 beats / minute (sinus tachycardia). The mean heart rate is 68 beats / minute. Tachycardia events ()  suspected to be sinus however atrial tachycardia vs other SVT unable to rule out due to rate / P-T wave fusion.  No evidence of AV block is noted.   Rare premature atrial contractions noted.   Rare premature ventricular contractions noted.    No evidence of ventricular tachycardia is noted.  No evidence of ventricular arrhythmia is noted.  No pauses were noted.     Echocardiogram (5.28.20):  EF 54%  Bubble Study Negative.    Carotid US (5.26.20):  No Significant Carotid Stenosis.    PET (12.27.19):  This is probably a normal perfusion study. There is no evidence of ischemia.   This scan is suggestive of low risk for future cardiovascular events.   Small fixed perfusion abnormality of mild intensity in the apical inferior segment which is more pronounced on rest images.   The left ventricular cavity is noted to be normal on the stress studies. The stress left ventricular ejection fraction was calculated to be 65% and left ventricular global function is normal. The rest left ventricular cavity is noted to be normal. The rest left ventricular ejection fraction was calculated to be 59% and rest left ventricular global function is normal.   When compared to the resting ejection fraction (59%), the stress ejection fraction (65%) has increased.   The study quality is average.     Echocardiogram (5.9.18):  The left ventricle is normal in size.  Global left ventricular systolic function is normal. The left ventricular ejection fraction is 60%. Left ventricular diastolic function is normal. Asymmetric septal left ventricular hypertrophy is present. It is mild.   No significant valvular regurgitation could be appreciated on this study.    The pulmonary artery systolic pressure is 10 mmHg.     Review of patient's allergies indicates:   Allergen Reactions    Venom-wasp Swelling     No current facility-administered medications on file prior to encounter.     No current outpatient medications on file prior to encounter.     Review  of Systems   Respiratory:  Negative for chest tightness and shortness of breath.    Cardiovascular:  Positive for leg swelling (improved). Negative for chest pain and palpitations.   Neurological:  Negative for light-headedness.   All other systems reviewed and are negative.    Objective:     Vital Signs (Most Recent):  Temp: 97.9 °F (36.6 °C) (03/17/25 0753)  Pulse: 62 (03/17/25 0800)  Resp: 18 (03/17/25 0753)  BP: (!) 104/59 (03/17/25 0753)  SpO2: 97 % (03/17/25 0753) Vital Signs (24h Range):  Temp:  [97.8 °F (36.6 °C)-98.5 °F (36.9 °C)] 97.9 °F (36.6 °C)  Pulse:  [58-68] 62  Resp:  [18] 18  SpO2:  [95 %-97 %] 97 %  BP: (104-120)/(59-79) 104/59   Weight: 130.8 kg (288 lb 5.8 oz)  Body mass index is 36.04 kg/m².  SpO2: 97 %       Intake/Output Summary (Last 24 hours) at 3/17/2025 1607  Last data filed at 3/17/2025 0332  Gross per 24 hour   Intake 480 ml   Output 3000 ml   Net -2520 ml     Lines/Drains/Airways       Peripheral Intravenous Line  Duration                  Peripheral IV - Single Lumen 03/13/25 0940 20 G Left Forearm 4 days         Peripheral IV - Single Lumen 03/13/25 0940 20 G Right Forearm 4 days                  Significant Labs:   Chemistries:   Recent Labs   Lab 03/13/25  0944 03/13/25  1119 03/13/25  1119 03/14/25  0427 03/15/25  0630 03/16/25  0430 03/17/25  0418   NA  --  133*  --  139 138 138 136   K  --  3.8  --  3.9 3.9 4.0 4.6   CL  --  103   < > 104 105 105 101   CO2  --  22   < > 26 28 26 29   BUN  --  12.0   < > 10.6 14.7 13.5 14.7   CREATININE  --  0.64*   < > 0.62* 0.67* 0.66* 0.74   CALCIUM  --  7.7*   < > 7.7* 7.9* 7.8* 8.1*   BILITOT  --  1.7*  --  1.6*  --   --   --    ALKPHOS  --  95  --  91  --   --   --    ALT  --  43  --  39  --   --   --    AST  --  84*  --  62*  --   --   --    GLUCOSE  --  115*   < > 94 90 84 84   MG 1.90  --   --   --  2.20 1.90 2.00   TROPONINI <0.010  --   --   --   --   --   --     < > = values in this interval not displayed.        CBC/Anemia Labs:  "Coags:    Recent Labs   Lab 03/14/25  0427 03/15/25  0630 03/16/25  0430   WBC 7.66 6.77 5.72   HGB 14.4 13.0* 13.5*   HCT 41.8* 37.7* 38.3*    213 236   MCV 96.1* 96.7* 95.0*   RDW 13.0 13.2 13.1    No results for input(s): "PT", "INR", "APTT" in the last 168 hours.     Significant Imaging:  Imaging Results              CT Chest Abdomen Pelvis With IV Contrast (XPD) Oral Contrast for GI Bypass (Final result)  Result time 03/13/25 11:59:44      Final result by Fox Lao MD (03/13/25 11:59:44)                   Impression:      Bilateral consolidation in the peripheral lower lobes appears partially atelectasis, but pneumonia or aspiration also possible.    Cirrhosis.  Small pleural effusions.      Electronically signed by: Fox Lao  Date:    03/13/2025  Time:    11:59               Narrative:    EXAMINATION:  CT CHEST ABDOMEN PELVIS WITH IV CONTRAST (XPD)    CLINICAL HISTORY:  Severe left upper quadrant abdominal pain but also having chest pain with hemoptysis;    TECHNIQUE:  CT imaging from the chest through the pelvis after IV contrast.  Axial, coronal and sagittal reformatted images reviewed. Dose length product 2365 mGycm. Automatic exposure control, adjustment of mA/kV or iterative reconstruction technique used to limit radiation dose.    COMPARISON:  CT abdomen/pelvis 08/01/2021    FINDINGS:  Lung and large airways: Areas of peripheral consolidation in both lower lobes.    Pleura: Small bilateral pleural effusions.    Mediastinum and lauren: Normal heart size. No pericardial effusion.  Minimally enlarged right paraesophageal lymph node measuring 12 mm short axis similar since 2021.    Chest wall/axilla and lower neck: No significant findings.    Liver/biliary: Cirrhosis.  No suspicious liver lesion identified on single phase exam.  No biliary dilatation.    Pancreas: Normal.    Spleen: Normal.    Adrenals: Normal.    Genitourinary: Symmetric enhancement of the kidneys.  Several parapelvic " renal cysts.  No significant ureteral dilatation.  Bladder within normal limits.    Stomach/Bowel: Prior partial gastrectomy.  No bowel obstruction.  No discernible sites of bowel inflammation.    Abdominal/pelvic lymph nodes and peritoneum: No pathologically enlarged lymph node identified. No ascites, free air or fluid collection.    Abdominal/pelvic vasculature: Normal caliber of the abdominal aorta.  Moderate caliber splenorenal shunt.    Abdominal wall: Small fat containing umbilical hernia.    Musculoskeletal: No acute osseous findings.                                       X-Ray Chest 1 View (Final result)  Result time 03/13/25 11:01:21      Final result by Neema Michael MD (03/13/25 11:01:21)                   Impression:      Patchy bibasilar airspace with small pleural effusions.      Electronically signed by: Neema Michael  Date:    03/13/2025  Time:    11:01               Narrative:    EXAMINATION:  XR CHEST 1 VIEW    CLINICAL HISTORY:  cough;    TECHNIQUE:  AP chest    COMPARISON:  Chest x-ray dated 08/01/2021    FINDINGS:  The heart is stable in size.  There are patchy bibasilar airspace opacities with small pleural effusions.  There is no visible pneumothorax.                                      Telemetry:  SR    Physical Exam  Vitals and nursing note reviewed.   HENT:      Head: Normocephalic.      Mouth/Throat:      Mouth: Mucous membranes are moist.      Pharynx: Oropharynx is clear.   Eyes:      Extraocular Movements: Extraocular movements intact.   Cardiovascular:      Rate and Rhythm: Normal rate and regular rhythm.      Pulses: Normal pulses.   Pulmonary:      Effort: Pulmonary effort is normal. No respiratory distress.      Comments: Diminished breath sounds bilaterally   Abdominal:      Palpations: Abdomen is soft.   Musculoskeletal:      Right lower leg: No edema (resolved).      Left lower leg: Edema (improved) present.   Skin:     General: Skin is warm and dry.   Neurological:       General: No focal deficit present.      Mental Status: He is alert and oriented to person, place, and time. Mental status is at baseline.   Psychiatric:         Behavior: Behavior normal.       Home Medications:   Medications Ordered Prior to Encounter[1]  Current Schedule Inpatient Medications:   apixaban  10 mg Oral BID    azithromycin  500 mg Intravenous Q24H    cefTRIAXone (Rocephin) IV (PEDS and ADULTS)  1 g Intravenous Q24H    famotidine  20 mg Oral BID    furosemide (LASIX) injection  40 mg Intravenous Q12H    metoprolol tartrate  25 mg Oral BID     Continuous Infusions:      Assessment:   Atrial Fibrillation with RVR (New Onset)- Now SB    - [id number]: 2  Chest Pain (Resolved)    - Troponin Normal    - PET (12/19): No Ischemia  Acute DVT    - BLE NIVA (3.14.25): deep vein thrombosis in the left popliteal vein, peroneal veins  Bilateral moderate to large volume pleural effusions  Pneumonia  Hypotension (Recovered with IV Hydration)    - History of Hypertension  Hyperlipidemia  DM II    - Hyperglycemia  Hyponatremia  Generalized Weakness/Chronic Arthritis (Osteo)  PTSD  Hypothyroidism  Peripheral Neuropathy  Chronic Lower Back Pain  VIVIAN (Resolved with Weight Loss)  Hepatic Cirrhosis   Anxiety  Depression  History of Obesity with Gastric Sleeve Placement     Plan:   Decrease to Metoprolol Tartrate 12.5 Mg PO BID  Eliquis 10 mg BID x 7 days, then resume Eliquis 5 mg BID for DVT/PAF.   Transition to Lasix 40 mg PO BID.   Ensure accurate I&O's and daily weights.  Consider outpatient Sleep Study on outpatient basis.   Stable for discharge from cardiac standpoint.  F/u with [facility] in 1 week.       OSVALDO Damon  Cardiology  [facility]     I agree with the findings of the complexity of problems addressed and take responsibility for the plan's risks and complications. I approved the plan documented by Yanet Hunt NP.                   [1]   No current facility-administered medications on  file prior to encounter.     No current outpatient medications on file prior to encounter.

## 2025-03-18 LAB
BACTERIA BLD CULT: NORMAL
BACTERIA BLD CULT: NORMAL

## 2025-04-03 NOTE — PHYSICIAN QUERY
Due to the conflicting clinical picture, please clinically validate the Acute on chronic systolic heart failure.   If validated, please provide additional clinical support for the diagnosis.     The condition is not confirmed and/or it has been ruled out